# Patient Record
Sex: FEMALE | Race: WHITE | NOT HISPANIC OR LATINO | Employment: UNEMPLOYED | URBAN - METROPOLITAN AREA
[De-identification: names, ages, dates, MRNs, and addresses within clinical notes are randomized per-mention and may not be internally consistent; named-entity substitution may affect disease eponyms.]

---

## 2017-01-03 ENCOUNTER — HOSPITAL ENCOUNTER (OUTPATIENT)
Dept: LAB | Facility: MEDICAL CENTER | Age: 27
End: 2017-01-03
Attending: NURSE PRACTITIONER
Payer: COMMERCIAL

## 2017-01-03 LAB
ALBUMIN SERPL BCP-MCNC: 3.9 G/DL (ref 3.2–4.9)
ALBUMIN/GLOB SERPL: 1.4 G/DL
ALP SERPL-CCNC: 39 U/L (ref 30–99)
ALT SERPL-CCNC: 9 U/L (ref 2–50)
ANION GAP SERPL CALC-SCNC: 8 MMOL/L (ref 0–11.9)
AST SERPL-CCNC: 14 U/L (ref 12–45)
BASOPHILS # BLD AUTO: 0.04 K/UL (ref 0–0.12)
BASOPHILS NFR BLD AUTO: 0.4 % (ref 0–1.8)
BILIRUB SERPL-MCNC: 2.2 MG/DL (ref 0.1–1.5)
BUN SERPL-MCNC: 8 MG/DL (ref 8–22)
CALCIUM SERPL-MCNC: 9.2 MG/DL (ref 8.5–10.5)
CHLORIDE SERPL-SCNC: 104 MMOL/L (ref 96–112)
CO2 SERPL-SCNC: 24 MMOL/L (ref 20–33)
CREAT SERPL-MCNC: 0.51 MG/DL (ref 0.5–1.4)
EOSINOPHIL # BLD: 0.07 K/UL (ref 0–0.51)
EOSINOPHIL NFR BLD AUTO: 0.7 % (ref 0–6.9)
ERYTHROCYTE [DISTWIDTH] IN BLOOD BY AUTOMATED COUNT: 44.6 FL (ref 35.9–50)
GLOBULIN SER CALC-MCNC: 2.7 G/DL (ref 1.9–3.5)
GLUCOSE SERPL-MCNC: 73 MG/DL (ref 65–99)
HBV SURFACE AG SERPL QL IA: NEGATIVE
HCT VFR BLD AUTO: 39.1 % (ref 37–47)
HGB BLD-MCNC: 12.7 G/DL (ref 12–16)
IMM GRANULOCYTES # BLD AUTO: 0.04 K/UL (ref 0–0.11)
IMM GRANULOCYTES NFR BLD AUTO: 0.4 % (ref 0–0.9)
LYMPHOCYTES # BLD: 1.81 K/UL (ref 1–4.8)
LYMPHOCYTES NFR BLD AUTO: 17.7 % (ref 22–41)
MCH RBC QN AUTO: 32.4 PG (ref 27–33)
MCHC RBC AUTO-ENTMCNC: 32.5 G/DL (ref 33.6–35)
MCV RBC AUTO: 99.7 FL (ref 81.4–97.8)
MONOCYTES # BLD: 0.56 K/UL (ref 0–0.85)
MONOCYTES NFR BLD AUTO: 5.5 % (ref 0–13.4)
NEUTROPHILS # BLD: 7.71 K/UL (ref 2–7.15)
NEUTROPHILS NFR BLD AUTO: 75.3 % (ref 44–72)
NRBC # BLD AUTO: 0 K/UL
NRBC BLD-RTO: 0 /100 WBC
PLATELET # BLD AUTO: 255 K/UL (ref 164–446)
PMV BLD AUTO: 8.8 FL (ref 9–12.9)
POTASSIUM SERPL-SCNC: 3.6 MMOL/L (ref 3.6–5.5)
PROT SERPL-MCNC: 6.6 G/DL (ref 6–8.2)
RBC # BLD AUTO: 3.92 M/UL (ref 4.2–5.4)
RUBV IGG SERPL IA-ACNC: 60.9 IU/ML
SODIUM SERPL-SCNC: 136 MMOL/L (ref 135–145)
TREPONEMA PALLIDUM IGG+IGM AB [PRESENCE] IN SERUM OR PLASMA BY IMMUNOASSAY: NON REACTIVE
WBC # BLD AUTO: 10.2 K/UL (ref 4.8–10.8)

## 2017-01-03 PROCEDURE — 86780 TREPONEMA PALLIDUM: CPT

## 2017-01-03 PROCEDURE — 87389 HIV-1 AG W/HIV-1&-2 AB AG IA: CPT

## 2017-01-03 PROCEDURE — 36415 COLL VENOUS BLD VENIPUNCTURE: CPT

## 2017-01-03 PROCEDURE — 86900 BLOOD TYPING SEROLOGIC ABO: CPT

## 2017-01-03 PROCEDURE — 86850 RBC ANTIBODY SCREEN: CPT

## 2017-01-03 PROCEDURE — 80053 COMPREHEN METABOLIC PANEL: CPT

## 2017-01-03 PROCEDURE — 86901 BLOOD TYPING SEROLOGIC RH(D): CPT

## 2017-01-03 PROCEDURE — 87077 CULTURE AEROBIC IDENTIFY: CPT

## 2017-01-03 PROCEDURE — 85025 COMPLETE CBC W/AUTO DIFF WBC: CPT

## 2017-01-03 PROCEDURE — 86762 RUBELLA ANTIBODY: CPT

## 2017-01-03 PROCEDURE — 87086 URINE CULTURE/COLONY COUNT: CPT

## 2017-01-03 PROCEDURE — 87340 HEPATITIS B SURFACE AG IA: CPT

## 2017-01-05 LAB
BACTERIA UR CULT: ABNORMAL
BACTERIA UR CULT: ABNORMAL
SIGNIFICANT IND 70042: ABNORMAL
SOURCE SOURCE: ABNORMAL

## 2017-01-11 ENCOUNTER — HOSPITAL ENCOUNTER (OUTPATIENT)
Dept: LAB | Facility: MEDICAL CENTER | Age: 27
End: 2017-01-11
Attending: OBSTETRICS & GYNECOLOGY
Payer: COMMERCIAL

## 2017-01-11 PROCEDURE — 36415 COLL VENOUS BLD VENIPUNCTURE: CPT

## 2017-01-11 PROCEDURE — 84163 PAPPA SERUM: CPT

## 2017-01-11 PROCEDURE — 84702 CHORIONIC GONADOTROPIN TEST: CPT

## 2017-01-16 LAB
# FETUSES US: 1
AGE AT DELIVERY: 24.9 YEARS
COMMENT  Z4564: NORMAL
FET CRL US.MEAS: 60.3 MM
FET NUCHAL FOLD MOM THICKNESS US.MEAS: 0.74
FET NUCHAL FOLD THICKNESS US.MEAS: 1.2 MM
FET TS 18 RISK FROM MAT AGE: NORMAL
FET TS 21 RISK FROM MAT AGE: NORMAL
GA: 13.3 WEEKS
HCG ADJ MOM SERPL: 1.26
HCG SERPL-ACNC: 112.9 IU/ML
INTEGRATED SCN PATIENT-IMP: NORMAL
NOTE Z4565: NORMAL
PAPP-A MOM SERPL: 1.23
PAPP-A SERPL-MCNC: 1983.8 NG/ML
RESULTS Z4535: NORMAL
SONOGRAPHER: NORMAL
SUBMIT PART2 SAMPLE USING Z4537: NORMAL
TS 18 RISK FETUS: NORMAL
TS 21 RISK FETUS: NORMAL
US DATE: NORMAL

## 2017-02-06 ENCOUNTER — HOSPITAL ENCOUNTER (OUTPATIENT)
Dept: LAB | Facility: MEDICAL CENTER | Age: 27
End: 2017-02-06
Attending: OBSTETRICS & GYNECOLOGY
Payer: COMMERCIAL

## 2017-02-06 PROCEDURE — 82105 ALPHA-FETOPROTEIN SERUM: CPT

## 2017-02-06 PROCEDURE — 36415 COLL VENOUS BLD VENIPUNCTURE: CPT

## 2017-02-06 PROCEDURE — 86336 INHIBIN A: CPT

## 2017-02-06 PROCEDURE — 84702 CHORIONIC GONADOTROPIN TEST: CPT

## 2017-02-06 PROCEDURE — 82677 ASSAY OF ESTRIOL: CPT

## 2017-02-09 LAB
# FETUSES US: 1
AFP ADJ MOM SERPL: 1.09
AFP SERPL-MCNC: 41.9 NG/ML
AGE AT DELIVERY: 24.9 YEARS
COLLECT DATE: NORMAL
COLLECT DATE: NORMAL
COMMENT  Z4564: NORMAL
FET CRL US.MEAS: 60.3 MM
FET NUCHAL FOLD MOM THICKNESS US.MEAS: 0.74
FET NUCHAL FOLD THICKNESS US.MEAS: 1.2 MM
FET TS 18 RISK FROM MAT AGE: NORMAL
FET TS 21 RISK FROM MAT AGE: NORMAL
GA: 13.3 WEEKS
GA: 17 WEEKS
HCG ADJ MOM SERPL: 1.08
HCG SERPL-ACNC: 34.1 IU/ML
IDDM PATIENT QL: NO
INHIBIN A ADJ MOM SERPL: 1.12
INHIBIN A SERPL-MCNC: 214.6 PG/ML
INTEGRATED SCN PATIENT-IMP: NORMAL
NEURAL TUBE DEFECT RISK FETUS: NORMAL %
NOTE Z4565: NORMAL
PAPP-A MOM SERPL: 1.23
PAPP-A SERPL-MCNC: 1983.8 NG/ML
RESULTS Z4535: NORMAL
SONOGRAPHER: NORMAL
TS 18 RISK FETUS: NORMAL
TS 21 RISK FETUS: NORMAL
U ESTRIOL ADJ MOM SERPL: 0.89
U ESTRIOL SERPL-MCNC: 0.97 NG/ML
US DATE: NORMAL

## 2017-02-14 ENCOUNTER — HOSPITAL ENCOUNTER (OUTPATIENT)
Dept: LAB | Facility: MEDICAL CENTER | Age: 27
End: 2017-02-14
Attending: OBSTETRICS & GYNECOLOGY
Payer: COMMERCIAL

## 2017-02-14 PROCEDURE — 87086 URINE CULTURE/COLONY COUNT: CPT

## 2017-02-16 LAB
BACTERIA UR CULT: NORMAL
SIGNIFICANT IND 70042: NORMAL
SITE SITE: NORMAL
SOURCE SOURCE: NORMAL

## 2017-04-24 ENCOUNTER — HOSPITAL ENCOUNTER (OUTPATIENT)
Dept: LAB | Facility: MEDICAL CENTER | Age: 27
End: 2017-04-24
Attending: OBSTETRICS & GYNECOLOGY
Payer: COMMERCIAL

## 2017-04-24 LAB
ERYTHROCYTE [DISTWIDTH] IN BLOOD BY AUTOMATED COUNT: 45.7 FL (ref 35.9–50)
GLUCOSE 1H P 50 G GLC PO SERPL-MCNC: 109 MG/DL (ref 70–139)
HCT VFR BLD AUTO: 35.9 % (ref 37–47)
HGB BLD-MCNC: 11.5 G/DL (ref 12–16)
MCH RBC QN AUTO: 33.2 PG (ref 27–33)
MCHC RBC AUTO-ENTMCNC: 32 G/DL (ref 33.6–35)
MCV RBC AUTO: 103.8 FL (ref 81.4–97.8)
PLATELET # BLD AUTO: 256 K/UL (ref 164–446)
PMV BLD AUTO: 9.2 FL (ref 9–12.9)
RBC # BLD AUTO: 3.46 M/UL (ref 4.2–5.4)
WBC # BLD AUTO: 9.6 K/UL (ref 4.8–10.8)

## 2017-04-24 PROCEDURE — 82950 GLUCOSE TEST: CPT

## 2017-04-24 PROCEDURE — 36415 COLL VENOUS BLD VENIPUNCTURE: CPT

## 2017-04-24 PROCEDURE — 85027 COMPLETE CBC AUTOMATED: CPT

## 2017-07-13 ENCOUNTER — HOSPITAL ENCOUNTER (INPATIENT)
Facility: MEDICAL CENTER | Age: 27
LOS: 2 days | End: 2017-07-15
Attending: OBSTETRICS & GYNECOLOGY | Admitting: OBSTETRICS & GYNECOLOGY
Payer: COMMERCIAL

## 2017-07-13 LAB
BASOPHILS # BLD AUTO: 0.3 % (ref 0–1.8)
BASOPHILS # BLD: 0.03 K/UL (ref 0–0.12)
EOSINOPHIL # BLD AUTO: 0.1 K/UL (ref 0–0.51)
EOSINOPHIL NFR BLD: 1 % (ref 0–6.9)
ERYTHROCYTE [DISTWIDTH] IN BLOOD BY AUTOMATED COUNT: 44.1 FL (ref 35.9–50)
HCT VFR BLD AUTO: 35.7 % (ref 37–47)
HGB BLD-MCNC: 11.8 G/DL (ref 12–16)
HOLDING TUBE BB 8507: NORMAL
IMM GRANULOCYTES # BLD AUTO: 0.08 K/UL (ref 0–0.11)
IMM GRANULOCYTES NFR BLD AUTO: 0.8 % (ref 0–0.9)
LYMPHOCYTES # BLD AUTO: 2.23 K/UL (ref 1–4.8)
LYMPHOCYTES NFR BLD: 22.7 % (ref 22–41)
MCH RBC QN AUTO: 33.1 PG (ref 27–33)
MCHC RBC AUTO-ENTMCNC: 33.1 G/DL (ref 33.6–35)
MCV RBC AUTO: 100 FL (ref 81.4–97.8)
MONOCYTES # BLD AUTO: 0.71 K/UL (ref 0–0.85)
MONOCYTES NFR BLD AUTO: 7.2 % (ref 0–13.4)
NEUTROPHILS # BLD AUTO: 6.69 K/UL (ref 2–7.15)
NEUTROPHILS NFR BLD: 68 % (ref 44–72)
NRBC # BLD AUTO: 0 K/UL
NRBC BLD AUTO-RTO: 0 /100 WBC
PLATELET # BLD AUTO: 226 K/UL (ref 164–446)
PMV BLD AUTO: 9.3 FL (ref 9–12.9)
RBC # BLD AUTO: 3.57 M/UL (ref 4.2–5.4)
WBC # BLD AUTO: 9.8 K/UL (ref 4.8–10.8)

## 2017-07-13 PROCEDURE — 304965 HCHG RECOVERY SERVICES

## 2017-07-13 PROCEDURE — 36415 COLL VENOUS BLD VENIPUNCTURE: CPT

## 2017-07-13 PROCEDURE — 10907ZC DRAINAGE OF AMNIOTIC FLUID, THERAPEUTIC FROM PRODUCTS OF CONCEPTION, VIA NATURAL OR ARTIFICIAL OPENING: ICD-10-PCS | Performed by: OBSTETRICS & GYNECOLOGY

## 2017-07-13 PROCEDURE — 3E033VJ INTRODUCTION OF OTHER HORMONE INTO PERIPHERAL VEIN, PERCUTANEOUS APPROACH: ICD-10-PCS | Performed by: OBSTETRICS & GYNECOLOGY

## 2017-07-13 PROCEDURE — 303615 HCHG EPIDURAL/SPINAL ANESTHESIA FOR LABOR

## 2017-07-13 PROCEDURE — 4A1HX4Z MONITORING OF PRODUCTS OF CONCEPTION, CARDIAC ELECTRICAL ACTIVITY, EXTERNAL APPROACH: ICD-10-PCS | Performed by: OBSTETRICS & GYNECOLOGY

## 2017-07-13 PROCEDURE — 700111 HCHG RX REV CODE 636 W/ 250 OVERRIDE (IP): Performed by: OBSTETRICS & GYNECOLOGY

## 2017-07-13 PROCEDURE — 59409 OBSTETRICAL CARE: CPT

## 2017-07-13 PROCEDURE — 700105 HCHG RX REV CODE 258: Performed by: OBSTETRICS & GYNECOLOGY

## 2017-07-13 PROCEDURE — 700111 HCHG RX REV CODE 636 W/ 250 OVERRIDE (IP)

## 2017-07-13 PROCEDURE — 85025 COMPLETE CBC W/AUTO DIFF WBC: CPT

## 2017-07-13 PROCEDURE — 770002 HCHG ROOM/CARE - OB PRIVATE (112)

## 2017-07-13 PROCEDURE — 700102 HCHG RX REV CODE 250 W/ 637 OVERRIDE(OP): Performed by: OBSTETRICS & GYNECOLOGY

## 2017-07-13 PROCEDURE — 700105 HCHG RX REV CODE 258

## 2017-07-13 PROCEDURE — A9270 NON-COVERED ITEM OR SERVICE: HCPCS | Performed by: OBSTETRICS & GYNECOLOGY

## 2017-07-13 PROCEDURE — 10H07YZ INSERTION OF OTHER DEVICE INTO PRODUCTS OF CONCEPTION, VIA NATURAL OR ARTIFICIAL OPENING: ICD-10-PCS | Performed by: OBSTETRICS & GYNECOLOGY

## 2017-07-13 RX ORDER — ACETAMINOPHEN 325 MG/1
325 TABLET ORAL EVERY 4 HOURS PRN
Status: DISCONTINUED | OUTPATIENT
Start: 2017-07-13 | End: 2017-07-15 | Stop reason: HOSPADM

## 2017-07-13 RX ORDER — METHYLERGONOVINE MALEATE 0.2 MG/ML
INJECTION INTRAVENOUS
Status: COMPLETED
Start: 2017-07-13 | End: 2017-07-13

## 2017-07-13 RX ORDER — VITAMIN A ACETATE, BETA CAROTENE, ASCORBIC ACID, CHOLECALCIFEROL, .ALPHA.-TOCOPHEROL ACETATE, DL-, THIAMINE MONONITRATE, RIBOFLAVIN, NIACINAMIDE, PYRIDOXINE HYDROCHLORIDE, FOLIC ACID, CYANOCOBALAMIN, CALCIUM CARBONATE, FERROUS FUMARATE, ZINC OXIDE, CUPRIC OXIDE 3080; 12; 120; 400; 1; 1.84; 3; 20; 22; 920; 25; 200; 27; 10; 2 [IU]/1; UG/1; MG/1; [IU]/1; MG/1; MG/1; MG/1; MG/1; MG/1; [IU]/1; MG/1; MG/1; MG/1; MG/1; MG/1
1 TABLET, FILM COATED ORAL EVERY MORNING
Status: DISCONTINUED | OUTPATIENT
Start: 2017-07-14 | End: 2017-07-15 | Stop reason: HOSPADM

## 2017-07-13 RX ORDER — OXYTOCIN 10 [USP'U]/ML
10 INJECTION, SOLUTION INTRAMUSCULAR; INTRAVENOUS
Status: DISCONTINUED | OUTPATIENT
Start: 2017-07-13 | End: 2017-07-13 | Stop reason: HOSPADM

## 2017-07-13 RX ORDER — SODIUM CHLORIDE, SODIUM LACTATE, POTASSIUM CHLORIDE, CALCIUM CHLORIDE 600; 310; 30; 20 MG/100ML; MG/100ML; MG/100ML; MG/100ML
INJECTION, SOLUTION INTRAVENOUS
Status: COMPLETED
Start: 2017-07-13 | End: 2017-07-13

## 2017-07-13 RX ORDER — METHYLERGONOVINE MALEATE 0.2 MG/ML
0.2 INJECTION INTRAVENOUS
Status: DISCONTINUED | OUTPATIENT
Start: 2017-07-13 | End: 2017-07-13 | Stop reason: HOSPADM

## 2017-07-13 RX ORDER — SODIUM CHLORIDE, SODIUM LACTATE, POTASSIUM CHLORIDE, CALCIUM CHLORIDE 600; 310; 30; 20 MG/100ML; MG/100ML; MG/100ML; MG/100ML
INJECTION, SOLUTION INTRAVENOUS CONTINUOUS
Status: DISPENSED | OUTPATIENT
Start: 2017-07-13 | End: 2017-07-13

## 2017-07-13 RX ORDER — MISOPROSTOL 200 UG/1
800 TABLET ORAL
Status: COMPLETED | OUTPATIENT
Start: 2017-07-13 | End: 2017-07-13

## 2017-07-13 RX ORDER — PENICILLIN G POTASSIUM 5000000 [IU]/1
5 INJECTION, POWDER, FOR SOLUTION INTRAMUSCULAR; INTRAVENOUS ONCE
Status: COMPLETED | OUTPATIENT
Start: 2017-07-13 | End: 2017-07-13

## 2017-07-13 RX ORDER — ONDANSETRON 2 MG/ML
4 INJECTION INTRAMUSCULAR; INTRAVENOUS EVERY 6 HOURS PRN
Status: DISCONTINUED | OUTPATIENT
Start: 2017-07-13 | End: 2017-07-15 | Stop reason: HOSPADM

## 2017-07-13 RX ORDER — ONDANSETRON 4 MG/1
4 TABLET, ORALLY DISINTEGRATING ORAL EVERY 6 HOURS PRN
Status: DISCONTINUED | OUTPATIENT
Start: 2017-07-13 | End: 2017-07-15 | Stop reason: HOSPADM

## 2017-07-13 RX ORDER — MISOPROSTOL 200 UG/1
600 TABLET ORAL
Status: DISCONTINUED | OUTPATIENT
Start: 2017-07-13 | End: 2017-07-15 | Stop reason: HOSPADM

## 2017-07-13 RX ORDER — IBUPROFEN 600 MG/1
600 TABLET ORAL EVERY 6 HOURS PRN
Status: DISCONTINUED | OUTPATIENT
Start: 2017-07-13 | End: 2017-07-15 | Stop reason: HOSPADM

## 2017-07-13 RX ORDER — OXYCODONE HYDROCHLORIDE AND ACETAMINOPHEN 5; 325 MG/1; MG/1
1 TABLET ORAL EVERY 4 HOURS PRN
Status: DISCONTINUED | OUTPATIENT
Start: 2017-07-13 | End: 2017-07-15 | Stop reason: HOSPADM

## 2017-07-13 RX ORDER — MAG HYDROX/ALUMINUM HYD/SIMETH 400-400-40
1 SUSPENSION, ORAL (FINAL DOSE FORM) ORAL
Status: DISCONTINUED | OUTPATIENT
Start: 2017-07-13 | End: 2017-07-15 | Stop reason: HOSPADM

## 2017-07-13 RX ORDER — OXYCODONE AND ACETAMINOPHEN 10; 325 MG/1; MG/1
1 TABLET ORAL EVERY 4 HOURS PRN
Status: DISCONTINUED | OUTPATIENT
Start: 2017-07-13 | End: 2017-07-15 | Stop reason: HOSPADM

## 2017-07-13 RX ORDER — ROPIVACAINE HYDROCHLORIDE 2 MG/ML
INJECTION, SOLUTION EPIDURAL; INFILTRATION; PERINEURAL
Status: COMPLETED
Start: 2017-07-13 | End: 2017-07-13

## 2017-07-13 RX ORDER — ONDANSETRON 2 MG/ML
INJECTION INTRAMUSCULAR; INTRAVENOUS
Status: COMPLETED
Start: 2017-07-13 | End: 2017-07-13

## 2017-07-13 RX ORDER — SODIUM CHLORIDE, SODIUM LACTATE, POTASSIUM CHLORIDE, CALCIUM CHLORIDE 600; 310; 30; 20 MG/100ML; MG/100ML; MG/100ML; MG/100ML
INJECTION, SOLUTION INTRAVENOUS PRN
Status: DISCONTINUED | OUTPATIENT
Start: 2017-07-13 | End: 2017-07-15 | Stop reason: HOSPADM

## 2017-07-13 RX ORDER — CARBOPROST TROMETHAMINE 250 UG/ML
250 INJECTION, SOLUTION INTRAMUSCULAR
Status: DISCONTINUED | OUTPATIENT
Start: 2017-07-13 | End: 2017-07-15 | Stop reason: HOSPADM

## 2017-07-13 RX ORDER — DEXTROSE, SODIUM CHLORIDE, SODIUM LACTATE, POTASSIUM CHLORIDE, AND CALCIUM CHLORIDE 5; .6; .31; .03; .02 G/100ML; G/100ML; G/100ML; G/100ML; G/100ML
INJECTION, SOLUTION INTRAVENOUS CONTINUOUS
Status: DISCONTINUED | OUTPATIENT
Start: 2017-07-13 | End: 2017-07-13 | Stop reason: HOSPADM

## 2017-07-13 RX ORDER — DOCUSATE SODIUM 100 MG/1
100 CAPSULE, LIQUID FILLED ORAL 2 TIMES DAILY PRN
Status: DISCONTINUED | OUTPATIENT
Start: 2017-07-13 | End: 2017-07-15 | Stop reason: HOSPADM

## 2017-07-13 RX ORDER — METHYLERGONOVINE MALEATE 0.2 MG/ML
0.2 INJECTION INTRAVENOUS
Status: DISCONTINUED | OUTPATIENT
Start: 2017-07-13 | End: 2017-07-15 | Stop reason: HOSPADM

## 2017-07-13 RX ORDER — CARBOPROST TROMETHAMINE 250 UG/ML
250 INJECTION, SOLUTION INTRAMUSCULAR
Status: DISCONTINUED | OUTPATIENT
Start: 2017-07-13 | End: 2017-07-13 | Stop reason: HOSPADM

## 2017-07-13 RX ORDER — BISACODYL 10 MG
10 SUPPOSITORY, RECTAL RECTAL PRN
Status: DISCONTINUED | OUTPATIENT
Start: 2017-07-13 | End: 2017-07-15 | Stop reason: HOSPADM

## 2017-07-13 RX ORDER — ALUMINA, MAGNESIA, AND SIMETHICONE 2400; 2400; 240 MG/30ML; MG/30ML; MG/30ML
30 SUSPENSION ORAL EVERY 6 HOURS PRN
Status: DISCONTINUED | OUTPATIENT
Start: 2017-07-13 | End: 2017-07-13 | Stop reason: HOSPADM

## 2017-07-13 RX ORDER — RANITIDINE 150 MG/1
150 TABLET ORAL 2 TIMES DAILY
Status: ON HOLD | COMMUNITY
End: 2019-10-25

## 2017-07-13 RX ORDER — ONDANSETRON 4 MG/1
4 TABLET, ORALLY DISINTEGRATING ORAL DAILY
Status: ON HOLD | COMMUNITY
End: 2017-07-15

## 2017-07-13 RX ADMIN — SODIUM CHLORIDE, POTASSIUM CHLORIDE, SODIUM LACTATE AND CALCIUM CHLORIDE: 600; 310; 30; 20 INJECTION, SOLUTION INTRAVENOUS at 07:08

## 2017-07-13 RX ADMIN — OXYTOCIN 125 ML/HR: 10 INJECTION, SOLUTION INTRAMUSCULAR; INTRAVENOUS at 21:00

## 2017-07-13 RX ADMIN — ROPIVACAINE HYDROCHLORIDE 10 ML/HR: 2 INJECTION, SOLUTION EPIDURAL; INFILTRATION at 17:32

## 2017-07-13 RX ADMIN — SODIUM CHLORIDE, SODIUM LACTATE, POTASSIUM CHLORIDE, CALCIUM CHLORIDE AND DEXTROSE MONOHYDRATE: 5; 600; 310; 30; 20 INJECTION, SOLUTION INTRAVENOUS at 15:18

## 2017-07-13 RX ADMIN — ONDANSETRON 4 MG: 2 INJECTION INTRAMUSCULAR; INTRAVENOUS at 16:58

## 2017-07-13 RX ADMIN — OXYTOCIN 1 MILLI-UNITS/MIN: 10 INJECTION, SOLUTION INTRAMUSCULAR; INTRAVENOUS at 07:22

## 2017-07-13 RX ADMIN — SODIUM CHLORIDE 2.5 MILLION UNITS: 9 INJECTION, SOLUTION INTRAVENOUS at 16:00

## 2017-07-13 RX ADMIN — IBUPROFEN 600 MG: 600 TABLET, FILM COATED ORAL at 20:55

## 2017-07-13 RX ADMIN — MISOPROSTOL 800 MCG: 200 TABLET ORAL at 19:44

## 2017-07-13 RX ADMIN — PENICILLIN G POTASSIUM 5 MILLION UNITS: 5000000 POWDER, FOR SOLUTION INTRAMUSCULAR; INTRAPLEURAL; INTRATHECAL; INTRAVENOUS at 07:21

## 2017-07-13 RX ADMIN — SODIUM CHLORIDE, POTASSIUM CHLORIDE, SODIUM LACTATE AND CALCIUM CHLORIDE 1000 ML: 600; 310; 30; 20 INJECTION, SOLUTION INTRAVENOUS at 16:59

## 2017-07-13 RX ADMIN — ALUMINUM HYDROXIDE, MAGNESIUM HYDROXIDE,SIMETHICONE 30 ML: 400; 400; 40 LIQUID ORAL at 07:16

## 2017-07-13 RX ADMIN — SODIUM CHLORIDE 2.5 MILLION UNITS: 9 INJECTION, SOLUTION INTRAVENOUS at 11:59

## 2017-07-13 ASSESSMENT — COPD QUESTIONNAIRES
DO YOU EVER COUGH UP ANY MUCUS OR PHLEGM?: NO/ONLY WITH OCCASIONAL COLDS OR INFECTIONS
COPD SCREENING SCORE: 0
DURING THE PAST 4 WEEKS HOW MUCH DID YOU FEEL SHORT OF BREATH: NONE/LITTLE OF THE TIME
HAVE YOU SMOKED AT LEAST 100 CIGARETTES IN YOUR ENTIRE LIFE: NO/DON'T KNOW

## 2017-07-13 ASSESSMENT — PATIENT HEALTH QUESTIONNAIRE - PHQ9
SUM OF ALL RESPONSES TO PHQ QUESTIONS 1-9: 0
2. FEELING DOWN, DEPRESSED, IRRITABLE, OR HOPELESS: NOT AT ALL
1. LITTLE INTEREST OR PLEASURE IN DOING THINGS: NOT AT ALL
SUM OF ALL RESPONSES TO PHQ9 QUESTIONS 1 AND 2: 0

## 2017-07-13 ASSESSMENT — PAIN SCALES - GENERAL: PAINLEVEL_OUTOF10: 2

## 2017-07-13 ASSESSMENT — LIFESTYLE VARIABLES
DO YOU DRINK ALCOHOL: NO
DO YOU DRINK ALCOHOL: NO
EVER_SMOKED: NEVER
ALCOHOL_USE: NO

## 2017-07-13 NOTE — CARE PLAN
Problem: Pain  Goal: Alleviation of Pain or a reduction in pain to the patient’s comfort goal  Outcome: PROGRESSING AS EXPECTED  Pt knows her pain management options. She will let the RN know when she is requiring pain intervention.

## 2017-07-13 NOTE — H&P
"H&P    28yo  @ 39w2d here for elective IOL. Feeling occ cramping. Denies LOF/VB    PN Care:  Working  XIN 17  RH+, RI, GBS positive   Sequential neg, US normal  Intermittent fetal arrythmia with normal fetal echo    PMH: Depression  PSH: [place] tooth removal  Meds: PN vitamins  All: NKDA  FH: Breast cancer, cholesterol, HTN, Pancreatic cancer  Gyn: No hx of HSV  Ob:  G1-term  7lb7oz   G2-current  Soc:  Ryan. No BILLIE    Blood pressure 134/82, pulse 101, temperature 36.6 °C (97.8 °F), resp. rate 18, height 1.549 m (5' 1\"), weight 68.947 kg (152 lb).  Gen: NAD  SVE: 2/50/-3  [place]: CTX q6-10  FHT: 140 with moderate LTV. +accels. Occ variable    Labs; Hct 35, Plt 226    A/P 25yo  @ 39w2d for elective IOL  1. Labor: IOL with pitocin. R/B/A reviewed including increased risk of csxn. Declines to wait expectantly for labor  2. Fetal: Cat II  3. Pain: epidural prn  4. GBS positive on PCN   "

## 2017-07-13 NOTE — IP AVS SNAPSHOT
CookItFor.Us Access Code: Activation code not generated  Current CookItFor.Us Status: Active    [employer]  A secure, online tool to manage your health information     [employer]’s CookItFor.Us® is a secure, online tool that connects you to your personalized health information from the privacy of your home -- day or night - making it very easy for you to manage your healthcare. Once the activation process is completed, you can even access your medical information using the CookItFor.Us greg, which is available for free in the Apple Greg store or Google Play store.     CookItFor.Us provides the following levels of access (as shown below):   My Chart Features   [facility] Primary Care Doctor [facility]  Specialists [facility]  Urgent  Care Non-[facility]  Primary Care  Doctor   Email your healthcare team securely and privately 24/7 X X X X   Manage appointments: schedule your next appointment; view details of past/upcoming appointments X      Request prescription refills. X      View recent personal medical records, including lab and immunizations X X X X   View health record, including health history, allergies, medications X X X X   Read reports about your outpatient visits, procedures, consult and ER notes X X X X   See your discharge summary, which is a recap of your hospital and/or ER visit that includes your diagnosis, lab results, and care plan. X X       How to register for CookItFor.Us:  1. Go to  [email or web address].  2. Click on the Sign Up Now box, which takes you to the New Member Sign Up page. You will need to provide the following information:  a. Enter your CookItFor.Us Access Code exactly as it appears at the top of this page. (You will not need to use this code after you’ve completed the sign-up process. If you do not sign up before the expiration date, you must request a new code.)   b. Enter your date of birth.   c. Enter your home email address.   d. Click Submit, and follow the next screen’s instructions.  3. Create a CookItFor.Us ID. This will  be your Doculogy login ID and cannot be changed, so think of one that is secure and easy to remember.  4. Create a Doculogy password. You can change your password at any time.  5. Enter your Password Reset Question and Answer. This can be used at a later time if you forget your password.   6. Enter your e-mail address. This allows you to receive e-mail notifications when new information is available in Doculogy.  7. Click Sign Up. You can now view your health information.    For assistance activating your Doculogy account, call [phone]

## 2017-07-13 NOTE — PROGRESS NOTES
1345: Report received from REGULO Sigala RN. POC discussed. Pt doing well. Starting to feel UCs but they are not painful. Pitocin infusing at 15 mu/min. EFT/toco in place. VSS. FOB at bedside.  1605: Pt requesting to be checked to see if she is making progress. Pt reports mild pain at this time and Pitocin is now at 20mu/min. SVE by RN, 4/75/-2.  1720: Dr. Silverman at bedside to place epidural.  1850: Report given to GABRIEL Dow RN. POC discussed.

## 2017-07-13 NOTE — PROGRESS NOTES
"S: Feeling CTX but not painful    O: Blood pressure 127/77, pulse 88, temperature 36.6 °C (97.8 °F), resp. rate 18, height 1.549 m (5' 1\"), weight 68.947 kg (152 lb).  Gen: NAD  SVE: 3/60/-2  AROM clear  [place]: CTX q3-4  FHT: Baseline 120 with moderate LTV. +accels. No variables/decels    A/P 27yo  @ 39w2d  1. Labor: Continue pitocin  2. Cat I  3. Epidural prn  4. GBS positive on PCN    "

## 2017-07-13 NOTE — IP AVS SNAPSHOT
7/15/2017    Amy Ireland  [address] [place] [id number]    Dear Amy:    [facility] wants to ensure your discharge home is safe and you or your loved ones have had all of your questions answered regarding your care after you leave the hospital.    Below is a list of resources and contact information should you have any questions regarding your hospital stay, follow-up instructions, or active medical symptoms.    Questions or Concerns Regarding… Contact   Medical Questions Related to Your Discharge  (7 days a week, 8am-5pm) Contact a Nurse Care Coordinator   [phone]   Medical Questions Not Related to Your Discharge  (24 hours a day / 7 days a week)  Contact the Nurse Health Line   [phone]    Medications or Discharge Instructions Refer to your discharge packet   or contact your [facility] Primary Care Provider   [phone]   Follow-up Appointment(s) Schedule your appointment via [employer]   or contact Scheduling [phone]   Billing Review your statement via [employer]  or contact Billing [phone]   Medical Records Review your records via [employer]   or contact Medical Records [phone]     You may receive a telephone call within two days of discharge. This call is to make certain you understand your discharge instructions and have the opportunity to have any questions answered. You can also easily access your medical information, test results and upcoming appointments via the [employer] free online health management tool. You can learn more and sign up at [employer]. For assistance setting up your [employer] account, please call [phone].    Once again, we want to ensure your discharge home is safe and that you have a clear understanding of any next steps in your care. If you have any questions or concerns, please do not hesitate to contact us, we are here for you. Thank you for choosing [facility] for your healthcare needs.    Sincerely,    Your [facility] Healthcare Team

## 2017-07-13 NOTE — IP AVS SNAPSHOT
Home Care Instructions                                                                                                                Amy Ireland   MRN: [id number]    Department:  POST PARTUM 31   2017           Follow-up Information     1. Follow up with Gena Mas M.D.. Schedule an appointment as soon as possible for a visit in 6 weeks.    Specialty:  OB/Gyn    Contact information    [id number]  [place] [address]  [phone]         I assume responsibility for securing a follow-up [place] Screening blood test on my baby within the specified date range.    -                  Discharge Instructions       POSTPARTUM DISCHARGE INSTRUCTIONS FOR MOM    YOB: 1990   Age: 26 y.o.               Admit Date: 2017     Discharge Date: 7/15/2017  Attending Doctor:  Gena Mas M.D.                  Allergies:  Review of patient's allergies indicates no known allergies.    Discharged to home by car. Discharged via wheelchair, hospital escort: Yes.  Special equipment needed: Not Applicable  Belongings with: Personal  Be sure to schedule a follow-up appointment with your primary care doctor or any specialists as instructed.     Discharge Plan:   Diet Plan: Discussed  Activity Level: Discussed  Confirmed Follow up Appointment: Appointment Scheduled  Confirmed Symptoms Management: Discussed  Medication Reconciliation Updated: Yes  Influenza Vaccine Indication: Indicated: Not available from distributor/    REASONS TO CALL YOUR OBSTETRICIAN:  1.   Persistent fever or shaking chills (Temperature higher than 100.4)  2.   Heavy bleeding (soaking more than 1 pad per hour); Passing clots  3.   Foul odor from vagina  4.   Mastitis (Breast infection; breast pain, chills, fever, redness)  5.   Urinary pain, burning or frequency  6.   Episiotomy infection  7.   Abdominal incision infection  8.   Severe depression longer than 24 hours    HAND WASHING  · Prior to handling the  "baby.  · Before breastfeeding or bottle feeding baby.  · After using the bathroom or changing the baby's diaper.    VAGINAL CARE  · Nothing inside vagina for 6 weeks: no sexual intercourse, tampons or douching.  · Bleeding may continue for 2-4 weeks.  Amount may vary.    · Call your physician for heavy bleeding which means soaking more than 1 pad per hour    BIRTH CONTROL  · It is possible to become pregnant at any time after delivery and while breastfeeding.  · Plan to discuss a method of birth control with your physician at your follow up visit. visit.    DIET AND ELIMINATION  · Eating more fiber (bran cereal, fruits, and vegetables) and drinking plenty of fluids will help to avoid constipation.  · Urinary frequency after childbirth is normal.    POSTPARTUM BLUES  During the first few days after birth, you may experience a sense of the \"blues\" which may include impatience, irritability or even crying.  These feeling come and go quickly.  However, as many as 1 in 10 women experience emotional symptoms known as postpartum depression.    Postpartum depression:  May start as early as the second or third day after delivery or take several weeks or months to develop.  Symptoms of \"blues\" are present, but are more intense:  Crying spells; loss of appetite; feelings of hopelessness or loss of control; fear of touching the baby; over concern or no concern at all about the baby; little or no concern about your own appearance/caring for yourself; and/or inability to sleep or excessive sleeping.  Contact your physician if you are experiencing any of these symptoms.    Crisis Hotline:  · [place] Crisis Hotline:  4-686-IQQWITJ  Or 1-105.529.8399  · [place] Crisis Hotline:  1-278.366.5429  Or 263-791-3444    PREVENTING SHAKEN BABY:  If you are angry or stressed, PUT THE BABY IN THE CRIB, step away, take some deep breaths, and wait until you are calm to care for the baby.  DO NOT SHAKE THE BABY.  You are not alone, call a " "supporter for help.    · Crisis Call Center 24/7 crisis line 496-445-8673 or 1-467.646.7635  · You can also text them, text \"ANSWER\" to 140567    QUIT SMOKING/TOBACCO USE:  I understand the use of any tobacco products increases my chance of suffering from future heart disease and could cause other illnesses which may shorten my life. Quitting the use of tobacco products is the single most important thing I can do to improve my health. For further information on smoking / tobacco cessation call a Toll Free Quit Line at 1-603.825.2446 (*National Cancer [place]) or 1-363.489.9175 (American Lung Association) or you can access the web based program at www.lungusa.org.    · [place] Tobacco Users Help Line:  (267) 107-4626       Toll Free: 1-321.933.5860  · Quit Tobacco Program [facility] (119)236-1568    DEPRESSION / SUICIDE RISK:  As you are discharged from this [facility], it is important to learn how to keep safe from harming yourself.    Recognize the warning signs:  · Abrupt changes in personality, positive or negative- including increase in energy   · Giving away possessions  · Change in eating patterns- significant weight changes-  positive or negative  · Change in sleeping patterns- unable to sleep or sleeping all the time   · Unwillingness or inability to communicate  · Depression  · Unusual sadness, discouragement and loneliness  · Talk of wanting to die  · Neglect of personal appearance   · Rebelliousness- reckless behavior  · Withdrawal from people/activities they love  · Confusion- inability to concentrate     If you or a loved one observes any of these behaviors or has concerns about self-harm, here's what you can do:  · Talk about it- your feelings and reasons for harming yourself  · Remove any means that you might use to hurt yourself (examples: pills, rope, extension cords, firearm)  · Get professional help from the community (Mental Health, Substance Abuse, " psychological counseling)  · Do not be alone:Call your Safe Contact- someone whom you trust who will be there for you.  · Call your local CRISIS HOTLINE 738-8508 or 555-237-2207  · Call your local Children's Mobile Crisis Response Team [place] (979) 435-7413 or www.GetSocial  · Call the toll free National Suicide Prevention Hotlines   · National Suicide Prevention Lifeline 482-728-SBBL (1384)  · National Hope Line Network 800-SUICIDE (121-9054)    DISCHARGE SURVEY:  Thank you for [facility].  We hope we provided you with very good care.  You may be receiving a survey in the mail.  Please fill it out.  Your opinion is valuable to us.    ADDITIONAL EDUCATIONAL MATERIALS GIVEN TO PATIENT:        My signature on this form indicates that:  1.  I have reviewed and understand the above information  2.  My questions regarding this information have been answered to my satisfaction.  3.  I have formulated a plan with my discharge nurse to obtain my prescribed medication for home.         Discharge Medication Instructions:    Below are the medications your physician expects you to take upon discharge:    Review all your home medications and newly ordered medications with your doctor and/or pharmacist. Follow medication instructions as directed by your doctor and/or pharmacist.    Please keep your medication list with you and share with your physician.               Medication List      START taking these medications        Instructions    Morning Afternoon Evening Bedtime    ibuprofen 600 MG Tabs   Last time this was given:  600 mg on 7/15/2017  9:15 AM   Commonly known as:  MOTRIN        Take 1 Tab by mouth every 6 hours as needed (For cramping after delivery; do not give if patient is receiving ketorolac (Toradol)).   Dose:  600 mg                        oxycodone-acetaminophen 5-325 MG Tabs   Last time this was given:  1 Tab on 7/15/2017  9:26 AM   Commonly known as:  PERCOCET        Take 1 Tab by  mouth every four hours as needed (for Moderate Pain (Pain Scale 4-6) after delivery).   Dose:  1 Tab                          CONTINUE taking these medications        Instructions    Morning Afternoon Evening Bedtime    PRENATAL 1 PO        Take  by mouth.                        ranitidine 150 MG Tabs   Commonly known as:  ZANTAC        Take 150 mg by mouth 2 times a day. Indications: Heartburn   Dose:  150 mg                          STOP taking these medications     ondansetron 4 MG Tbdp   Commonly known as:  ZOFRAN ODT                    Where to Get Your Medications      Information about where to get these medications is not yet available     ! Ask your nurse or doctor about these medications    - ibuprofen 600 MG Tabs  - oxycodone-acetaminophen 5-325 MG Tabs            Crisis Hotline:     [place] Crisis Hotline:  0-641-ZKXMYJR or 1-239.570.2898    [place] Crisis Hotline:    1-452.404.4896 or 420-490-1341        Disclaimer           _____________________________________                     __________       ________       Patient/Mother Signature or Legal                          Date                   Time

## 2017-07-13 NOTE — PROGRESS NOTES
0700. Report from Mimi CAI at the bedside. POC discussed and resumed. IV started, labs obtained and sent.    0720. SVE 1-2/50/-2. Pitocin started.

## 2017-07-13 NOTE — CARE PLAN
Problem: Knowledge Deficit  Goal: Patient/Support person demonstrates understanding regarding the progression of labor, available options and participates in decision-making process  Outcome: PROGRESSING AS EXPECTED  Pt and FOB are up to date on th POC. They will ask questions as needed and the RN will notify them of any changes.

## 2017-07-14 LAB
ERYTHROCYTE [DISTWIDTH] IN BLOOD BY AUTOMATED COUNT: 43.3 FL (ref 35.9–50)
HCT VFR BLD AUTO: 33.7 % (ref 37–47)
HGB BLD-MCNC: 10.9 G/DL (ref 12–16)
MCH RBC QN AUTO: 32.6 PG (ref 27–33)
MCHC RBC AUTO-ENTMCNC: 32.3 G/DL (ref 33.6–35)
MCV RBC AUTO: 100.9 FL (ref 81.4–97.8)
PLATELET # BLD AUTO: 186 K/UL (ref 164–446)
PMV BLD AUTO: 9.1 FL (ref 9–12.9)
RBC # BLD AUTO: 3.34 M/UL (ref 4.2–5.4)
WBC # BLD AUTO: 16.5 K/UL (ref 4.8–10.8)

## 2017-07-14 PROCEDURE — 700112 HCHG RX REV CODE 229: Performed by: OBSTETRICS & GYNECOLOGY

## 2017-07-14 PROCEDURE — A9270 NON-COVERED ITEM OR SERVICE: HCPCS | Performed by: OBSTETRICS & GYNECOLOGY

## 2017-07-14 PROCEDURE — 700102 HCHG RX REV CODE 250 W/ 637 OVERRIDE(OP): Performed by: OBSTETRICS & GYNECOLOGY

## 2017-07-14 PROCEDURE — 85027 COMPLETE CBC AUTOMATED: CPT

## 2017-07-14 PROCEDURE — 770002 HCHG ROOM/CARE - OB PRIVATE (112)

## 2017-07-14 PROCEDURE — 36415 COLL VENOUS BLD VENIPUNCTURE: CPT

## 2017-07-14 RX ADMIN — OXYCODONE HYDROCHLORIDE AND ACETAMINOPHEN 1 TABLET: 5; 325 TABLET ORAL at 14:46

## 2017-07-14 RX ADMIN — IBUPROFEN 600 MG: 600 TABLET, FILM COATED ORAL at 02:55

## 2017-07-14 RX ADMIN — IBUPROFEN 600 MG: 600 TABLET, FILM COATED ORAL at 09:44

## 2017-07-14 RX ADMIN — Medication 1 TABLET: at 09:44

## 2017-07-14 RX ADMIN — IBUPROFEN 600 MG: 600 TABLET, FILM COATED ORAL at 17:50

## 2017-07-14 RX ADMIN — OXYCODONE HYDROCHLORIDE AND ACETAMINOPHEN 1 TABLET: 5; 325 TABLET ORAL at 09:45

## 2017-07-14 RX ADMIN — IBUPROFEN 600 MG: 600 TABLET, FILM COATED ORAL at 23:48

## 2017-07-14 RX ADMIN — OXYCODONE HYDROCHLORIDE AND ACETAMINOPHEN 1 TABLET: 5; 325 TABLET ORAL at 20:10

## 2017-07-14 RX ADMIN — DOCUSATE SODIUM 100 MG: 100 CAPSULE ORAL at 20:14

## 2017-07-14 RX ADMIN — OXYCODONE HYDROCHLORIDE AND ACETAMINOPHEN 1 TABLET: 5; 325 TABLET ORAL at 00:22

## 2017-07-14 RX ADMIN — DOCUSATE SODIUM 100 MG: 100 CAPSULE ORAL at 09:45

## 2017-07-14 RX ADMIN — OXYCODONE HYDROCHLORIDE AND ACETAMINOPHEN 1 TABLET: 5; 325 TABLET ORAL at 04:56

## 2017-07-14 ASSESSMENT — PAIN SCALES - GENERAL
PAINLEVEL_OUTOF10: 6
PAINLEVEL_OUTOF10: 3
PAINLEVEL_OUTOF10: 5
PAINLEVEL_OUTOF10: 3
PAINLEVEL_OUTOF10: 3
PAINLEVEL_OUTOF10: 4
PAINLEVEL_OUTOF10: 6
PAINLEVEL_OUTOF10: 7
PAINLEVEL_OUTOF10: 3
PAINLEVEL_OUTOF10: 3
PAINLEVEL_OUTOF10: 4
PAINLEVEL_OUTOF10: 3

## 2017-07-14 ASSESSMENT — PATIENT HEALTH QUESTIONNAIRE - PHQ9
1. LITTLE INTEREST OR PLEASURE IN DOING THINGS: NOT AT ALL
SUM OF ALL RESPONSES TO PHQ QUESTIONS 1-9: 0
2. FEELING DOWN, DEPRESSED, IRRITABLE, OR HOPELESS: NOT AT ALL
SUM OF ALL RESPONSES TO PHQ9 QUESTIONS 1 AND 2: 0

## 2017-07-14 NOTE — PROGRESS NOTES
"S: requesting epidural     O: Blood pressure 129/68, pulse 94, temperature 36.4 °C (97.6 °F), temperature source Temporal, resp. rate 18, height 1.549 m (5' 1\"), weight 68.947 kg (152 lb).  Gen: NAD  SVE: /-2  Per RN  [place]: CTX q3-4  FHT: Baseline 130 with moderate LTV. +accels. Occ variable    A/P 25yo  @ 39w2d  1. Labor: Continue pitocin  2. Cat 2  3. Awaiting epidural   4. GBS positive on PCN     "

## 2017-07-14 NOTE — PROGRESS NOTES
Pt arrived via wheelchair with belongings to room S326. Report received from Mimi CAI from L&D. Pt oriented to room.  Assessment done. Fundus firm, lochia light. Vital signs stable. IV infusing 125ml/hr of pitocin. Pt states pain is tollerable, see MAR. Pt aware of dangers related to sleeping with infant, reviewed plan of care with pt, and encouraged to call with needs and prior to ambulating. Call light within reach. Will continue to monitor.

## 2017-07-14 NOTE — DELIVERY NOTE
Delivery note     Preoperative diagnosis: 1. Intrauterine pregnancy 39 weeks and 2 days 2. GBS positive  Postoperative diagnosis: Same  Primary delivering obstetrician  working    Procedure: Amy is a 26-year-old  2 para 1 at 39 weeks who presented for induction of labor at the time of presentation she was 2 cm dilated she underwent treatment for GBS positive status and was augmented with Pitocin and she underwent amniotomy revealed clear fluid. She received an epidural and progressed to complete she pushed well to deliver the baby girl OP. Anterior posterior shoulders delivered without difficulty and baby was placed on maternal stomach where she was immediately vigorous cortisol to pulsate for 2 minutes at which point he was clamped and cut placenta delivered easily and intact under gentle manual traction fundus is firm midline Pitocin was started she had no perineal lacerations 800 Cytotec was placed per rectum as prophylaxis    Findings  1. Baby girl at 1839 Apgars 8 and 9 weight currently pending name Scarlet  2. Clear amniotic fluid  3. Normal placenta with three-vessel cord    Counts correct    Consultations none apparent    Disposition stable.

## 2017-07-14 NOTE — PROGRESS NOTES
report received from NADIYA Alvares   ZAHIDA dutta Dr Working called    viable female apgars 1941 placenta S/I   report to JOANNA Alfonso

## 2017-07-14 NOTE — PROGRESS NOTES
2110: Report received from GABRIEL Dow RN. POC discussed with pt and family.   2250: Pt and infant transferred to postpartum in stable condition. Report given to Qian CAI. ID bands verified. Cuddles activated.

## 2017-07-14 NOTE — CARE PLAN
Problem: Altered physiologic condition related to immediate post-delivery state and potential for bleeding/hemorrhage  Goal: Patient physiologically stable as evidenced by normal lochia, palpable uterine involution and vital signs within normal limits  Outcome: PROGRESSING AS EXPECTED  Vital signs stable. Fundus firm, lochia light     Problem: Alteration in comfort related to episiotomy, vaginal repair and/or after birth pains  Goal: Patient verbalizes acceptable pain level  Outcome: PROGRESSING AS EXPECTED  Discussed 0-10 pain scale and available prn pain medications with pt. Pt states pain at acceptable level at this time

## 2017-07-14 NOTE — PROGRESS NOTES
0705-Report received at bedside from Qian CAI, assumed care of patient. Encouraged to call with needs, denies at this time.   0946-Assessment completed, fundus is firm, lochia light and vital signs within defined parameters. Patient is ambulating and voiding without difficulty. Bonding well with infant, breastfeeding independently.  Discussed with patient pain medication plan, patient requesting to be medicated PRN, will call for medication.  Family at bedside.  Plan of care discussed, patient verbalized understanding. Hourly rounding implemented, call light within reach.

## 2017-07-14 NOTE — PROGRESS NOTES
"SHABBIR @   Baby Girl \"Sallie\"  Apgars 8/9  Weight pending  No laceration  EBL 300cc  Cytotect 800mcg [place]    "

## 2017-07-14 NOTE — PROGRESS NOTES
"PPD#1    S: Doing well. Pain controlled. Lochia normal     O: Blood pressure 109/67, pulse 82, temperature 36.6 °C (97.8 °F), temperature source Temporal, resp. rate 18, height 1.549 m (5' 1\"), weight 68.947 kg (152 lb), SpO2 95 %.  Gen: NAD  Fundus firm below the umbilicus  Ext: no c/c/e    Labs: RH+, RI, GBS positive, Hct 35-->33    A/P 25yo  s/p   1. Routine pp care  2. Home tomorrow. GBS pos  "

## 2017-07-15 VITALS
HEART RATE: 100 BPM | DIASTOLIC BLOOD PRESSURE: 69 MMHG | WEIGHT: 152 LBS | TEMPERATURE: 98 F | RESPIRATION RATE: 18 BRPM | BODY MASS INDEX: 28.7 KG/M2 | OXYGEN SATURATION: 97 % | HEIGHT: 61 IN | SYSTOLIC BLOOD PRESSURE: 113 MMHG

## 2017-07-15 PROCEDURE — 700112 HCHG RX REV CODE 229: Performed by: OBSTETRICS & GYNECOLOGY

## 2017-07-15 PROCEDURE — A9270 NON-COVERED ITEM OR SERVICE: HCPCS | Performed by: OBSTETRICS & GYNECOLOGY

## 2017-07-15 PROCEDURE — 700102 HCHG RX REV CODE 250 W/ 637 OVERRIDE(OP): Performed by: OBSTETRICS & GYNECOLOGY

## 2017-07-15 RX ORDER — OXYCODONE HYDROCHLORIDE AND ACETAMINOPHEN 5; 325 MG/1; MG/1
1 TABLET ORAL EVERY 4 HOURS PRN
Qty: 15 TAB | Refills: 0 | Status: ON HOLD | OUTPATIENT
Start: 2017-07-15 | End: 2019-10-25

## 2017-07-15 RX ORDER — IBUPROFEN 600 MG/1
600 TABLET ORAL EVERY 6 HOURS PRN
Qty: 30 TAB | Refills: 2 | Status: ON HOLD | OUTPATIENT
Start: 2017-07-15 | End: 2019-10-25

## 2017-07-15 RX ADMIN — Medication 1 TABLET: at 09:15

## 2017-07-15 RX ADMIN — OXYCODONE HYDROCHLORIDE AND ACETAMINOPHEN 1 TABLET: 5; 325 TABLET ORAL at 09:26

## 2017-07-15 RX ADMIN — DOCUSATE SODIUM 100 MG: 100 CAPSULE ORAL at 09:16

## 2017-07-15 RX ADMIN — IBUPROFEN 600 MG: 600 TABLET, FILM COATED ORAL at 09:15

## 2017-07-15 RX ADMIN — OXYCODONE HYDROCHLORIDE AND ACETAMINOPHEN 1 TABLET: 5; 325 TABLET ORAL at 01:29

## 2017-07-15 ASSESSMENT — PAIN SCALES - GENERAL
PAINLEVEL_OUTOF10: 4
PAINLEVEL_OUTOF10: 4
PAINLEVEL_OUTOF10: 7
PAINLEVEL_OUTOF10: 3

## 2017-07-15 NOTE — CONSULTS
Spoke with parents regarding breastfeeding. Parents aware of infant's 7% weight loss and will have baby seen by Pediatrician in 2 days. Mom nursed her 1st baby for 14 mos. And had a good milk supply. Mom encouraged to call for assistance at next feeding.

## 2017-07-15 NOTE — DISCHARGE INSTRUCTIONS
POSTPARTUM DISCHARGE INSTRUCTIONS FOR MOM    YOB: 1990   Age: 26 y.o.               Admit Date: 7/13/2017     Discharge Date: 7/15/2017  Attending Doctor:  Gena Mas M.D.                  Allergies:  Review of patient's allergies indicates no known allergies.    Discharged to home by car. Discharged via wheelchair, hospital escort: Yes.  Special equipment needed: Not Applicable  Belongings with: Personal  Be sure to schedule a follow-up appointment with your primary care doctor or any specialists as instructed.     Discharge Plan:   Diet Plan: Discussed  Activity Level: Discussed  Confirmed Follow up Appointment: Appointment Scheduled  Confirmed Symptoms Management: Discussed  Medication Reconciliation Updated: Yes  Influenza Vaccine Indication: Indicated: Not available from distributor/    REASONS TO CALL YOUR OBSTETRICIAN:  1.   Persistent fever or shaking chills (Temperature higher than 100.4)  2.   Heavy bleeding (soaking more than 1 pad per hour); Passing clots  3.   Foul odor from vagina  4.   Mastitis (Breast infection; breast pain, chills, fever, redness)  5.   Urinary pain, burning or frequency  6.   Episiotomy infection  7.   Abdominal incision infection  8.   Severe depression longer than 24 hours    HAND WASHING  · Prior to handling the baby.  · Before breastfeeding or bottle feeding baby.  · After using the bathroom or changing the baby's diaper.    VAGINAL CARE  · Nothing inside vagina for 6 weeks: no sexual intercourse, tampons or douching.  · Bleeding may continue for 2-4 weeks.  Amount may vary.    · Call your physician for heavy bleeding which means soaking more than 1 pad per hour    BIRTH CONTROL  · It is possible to become pregnant at any time after delivery and while breastfeeding.  · Plan to discuss a method of birth control with your physician at your follow up visit. visit.    DIET AND ELIMINATION  · Eating more fiber (bran cereal, fruits, and vegetables) and  "drinking plenty of fluids will help to avoid constipation.  · Urinary frequency after childbirth is normal.    POSTPARTUM BLUES  During the first few days after birth, you may experience a sense of the \"blues\" which may include impatience, irritability or even crying.  These feeling come and go quickly.  However, as many as 1 in 10 women experience emotional symptoms known as postpartum depression.    Postpartum depression:  May start as early as the second or third day after delivery or take several weeks or months to develop.  Symptoms of \"blues\" are present, but are more intense:  Crying spells; loss of appetite; feelings of hopelessness or loss of control; fear of touching the baby; over concern or no concern at all about the baby; little or no concern about your own appearance/caring for yourself; and/or inability to sleep or excessive sleeping.  Contact your physician if you are experiencing any of these symptoms.    Crisis Hotline:  · [place] Crisis Hotline:  7-326-HDBLCYD  Or 1-160.591.7973  · [place] Crisis Hotline:  1-716.125.7574  Or 031-683-1527    PREVENTING SHAKEN BABY:  If you are angry or stressed, PUT THE BABY IN THE CRIB, step away, take some deep breaths, and wait until you are calm to care for the baby.  DO NOT SHAKE THE BABY.  You are not alone, call a supporter for help.    · Crisis Call Center 24/7 crisis line 501-636-7146 or 1-585.686.9678  · You can also text them, text \"ANSWER\" to 160497    QUIT SMOKING/TOBACCO USE:  I understand the use of any tobacco products increases my chance of suffering from future heart disease and could cause other illnesses which may shorten my life. Quitting the use of tobacco products is the single most important thing I can do to improve my health. For further information on smoking / tobacco cessation call a Toll Free Quit Line at 1-946.274.1470 (*[employer] [place]) or 1-936.268.4644 (American Lung Association) or you can access the web based program " at www.lungusa.org.    · [place] Tobacco Users Help Line:  (759) 308-4674       Toll Free: 1-957.175.6788  · Quit Tobacco Program [facility] (228)358-1766    DEPRESSION / SUICIDE RISK:  As you are discharged from this [facility], it is important to learn how to keep safe from harming yourself.    Recognize the warning signs:  · Abrupt changes in personality, positive or negative- including increase in energy   · Giving away possessions  · Change in eating patterns- significant weight changes-  positive or negative  · Change in sleeping patterns- unable to sleep or sleeping all the time   · Unwillingness or inability to communicate  · Depression  · Unusual sadness, discouragement and loneliness  · Talk of wanting to die  · Neglect of personal appearance   · Rebelliousness- reckless behavior  · Withdrawal from people/activities they love  · Confusion- inability to concentrate     If you or a loved one observes any of these behaviors or has concerns about self-harm, here's what you can do:  · Talk about it- your feelings and reasons for harming yourself  · Remove any means that you might use to hurt yourself (examples: pills, rope, extension cords, firearm)  · Get professional help from the community (Mental Health, Substance Abuse, psychological counseling)  · Do not be alone:Call your Safe Contact- someone whom you trust who will be there for you.  · Call your local CRISIS HOTLINE 271-3248 or 902-077-3377  · Call your local Children's Mobile Crisis Response Team [place] (633) 349-5080 or www.[employer]ytronUS  · Call the toll free National Suicide Prevention Hotlines   · National Suicide Prevention Lifeline 857-526-INLW (3770)  · National Hope Line Network 800-SUICIDE (651-6569)    DISCHARGE SURVEY:  Thank you for choosing [facility].  We hope we provided you with very good care.  You may be receiving a survey in the mail.  Please fill it out.  Your opinion is valuable to  us.    ADDITIONAL EDUCATIONAL MATERIALS GIVEN TO PATIENT:        My signature on this form indicates that:  1.  I have reviewed and understand the above information  2.  My questions regarding this information have been answered to my satisfaction.  3.  I have formulated a plan with my discharge nurse to obtain my prescribed medication for home.

## 2017-07-15 NOTE — DISCHARGE SUMMARY
CHIEF COMPLAINT ON ADMISSION  No chief complaint on file.      CODE STATUS  Full Code    HPI & HOSPITAL COURSE  This is a 26 y.o. female here with pregnancy, induction, and     Therefore, she is discharged in good and stable condition with close outpatient follow-up.    SPECIFIC OUTPATIENT FOLLOW-UP  6 weeks    DISCHARGE PROBLEM LIST  Active Problems:    * No active hospital problems. *  Resolved Problems:    * No resolved hospital problems. *      FOLLOW UP:  6 weeks with Dr Mas      MEDICATIONS ON DISCHARGE   Amy Ireland   Home Medication Instructions COLEMAN:[id number]    Printed on:07/15/17 0650   Medication Information                      ibuprofen (MOTRIN) 600 MG Tab  Take 1 Tab by mouth every 6 hours as needed (For cramping after delivery; do not give if patient is receiving ketorolac (Toradol)).             oxycodone-acetaminophen (PERCOCET) 5-325 MG Tab  Take 1 Tab by mouth every four hours as needed (for Moderate Pain (Pain Scale 4-6) after delivery).             Prenatal MV-Min-Fe Fum-FA-DHA (PRENATAL 1 PO)  Take  by mouth.             ranitidine (ZANTAC) 150 MG Tab  Take 150 mg by mouth 2 times a day. Indications: Heartburn                 DIET  Orders Placed This Encounter   Procedures   • Diet Order     Standing Status: Standing      Number of Occurrences: 1      Standing Expiration Date:      Order Specific Question:  Diet:     Answer:  Regular [1]     Order Specific Question:  Miscellaneous modifications:     Answer:  New Mom [16]       ACTIVITY  As tolerated.  n/a      CONSULTATIONS  none    PROCEDURES      LABORATORY  Lab Results   Component Value Date/Time    SODIUM 136 2017 12:37 PM    POTASSIUM 3.6 2017 12:37 PM    CHLORIDE 104 2017 12:37 PM    CO2 24 2017 12:37 PM    GLUCOSE 73 2017 12:37 PM    BUN 8 2017 12:37 PM    CREATININE 0.51 2017 12:37 PM        Lab Results   Component Value Date/Time    WBC 16.5* 2017 03:57 AM    HEMOGLOBIN  10.9* 07/14/2017 03:57 AM    HEMATOCRIT 33.7* 07/14/2017 03:57 AM    PLATELET COUNT 186 07/14/2017 03:57 AM

## 2017-07-15 NOTE — PROGRESS NOTES
Assisted with BF attempt, baby very fussy, after multiple attempts achieved deep latch with widely-flanged lips on left breast, mother denies pain when BF, strong and effective suck noted, baby BF 15 minutes. Attempt to latch on right breast, baby fussy, left [id number].    Infant weight down 7%, discussed concern over 7% weight loss in 26 hours with parents.    Mother has personal Medela pump at home, plan is to BF Q 2-3 hours, if no/suboptimal latch mother will pump and supplement per guidelines, supplement guidelines provided and explained.    Educated on outpatient assistance available at [facility], encouraged to schedule outpatient consult if needed/desired.

## 2017-07-15 NOTE — PROGRESS NOTES
0715-Report received at bedside from Kelin CAI, assumed care of patient. Encouraged to call with needs, denies at this time.   0926-Assessment completed, fundus is firm, lochia light and vital signs within defined parameters. Patient is ambulating and voiding without difficulty. Bonding well with infant, breastfeeding independently.  Discussed with patient pain medication plan, patient requesting to be medicated PRN, will call for medication.  Family at bedside.  Plan of care discussed, patient verbalized understanding. Hourly rounding implemented, call light within reach.

## 2017-08-13 ENCOUNTER — OFFICE VISIT (OUTPATIENT)
Dept: URGENT CARE | Facility: PHYSICIAN GROUP | Age: 27
End: 2017-08-13
Payer: COMMERCIAL

## 2017-08-13 VITALS
WEIGHT: 136 LBS | RESPIRATION RATE: 14 BRPM | OXYGEN SATURATION: 94 % | SYSTOLIC BLOOD PRESSURE: 102 MMHG | HEART RATE: 111 BPM | DIASTOLIC BLOOD PRESSURE: 60 MMHG | TEMPERATURE: 97.7 F | HEIGHT: 61 IN | BODY MASS INDEX: 25.68 KG/M2

## 2017-08-13 DIAGNOSIS — N61.0 ACUTE MASTITIS OF LEFT BREAST: ICD-10-CM

## 2017-08-13 PROCEDURE — 99204 OFFICE O/P NEW MOD 45 MIN: CPT | Performed by: FAMILY MEDICINE

## 2017-08-13 RX ORDER — AMOXICILLIN AND CLAVULANATE POTASSIUM 875; 125 MG/1; MG/1
1 TABLET, FILM COATED ORAL 2 TIMES DAILY
Qty: 14 TAB | Refills: 0 | Status: SHIPPED | OUTPATIENT
Start: 2017-08-13 | End: 2017-08-20

## 2017-08-13 NOTE — MR AVS SNAPSHOT
"        Amy Ireland   2017 3:30 PM   Office Visit   MRN: [id number]    Department:  [facility]   Dept Phone:  [phone]    Description:  Female : 1990   Provider:  Damien Khalil M.D.           Reason for Visit     Breast Pain poss mastitis, fever      Allergies as of 2017     No Known Allergies      You were diagnosed with     Acute mastitis of left breast   [[id number]]         Vital Signs     Blood Pressure Pulse Temperature Respirations Height Weight    102/60 mmHg 111 36.5 °C (97.7 °F) 14 1.549 m (5' 1\") 61.689 kg (136 lb)    Body Mass Index Oxygen Saturation Smoking Status             25.71 kg/m2 94% Never Smoker          Basic Information     Date Of Birth Sex Race Ethnicity Preferred Language    1990 Female White Non- English      Problem List              ICD-10-CM Priority Class Noted - Resolved    Dyspareunia, female N94.10   2013 - Present      Health Maintenance        Date Due Completion Dates    IMM HEP B VACCINE (1 of 3 - Primary Series) 1992 ---    IMM HEP A VACCINE (1 of 2 - Standard Series) 1993 ---    IMM HPV VACCINE (1 of 3 - Female 3 Dose Series) 2003 ---    IMM VARICELLA (CHICKENPOX) VACCINE (1 of 2 - 2 Dose Adolescent Series) 2005 ---    IMM DTaP/Tdap/Td Vaccine (1 - Tdap) 2011 ---    PAP SMEAR 2013    IMM INFLUENZA (1) 2017 10/11/2016, 10/5/2015, 11/3/2014, 10/30/2013, 10/15/2012, 2012            Current Immunizations     Influenza TIV (IM) 10/30/2013, 10/15/2012, 2012    Influenza Vaccine Quad Inj (Pf) 10/11/2016, 10/5/2015 10:25 AM, 11/3/2014      Below and/or attached are the medications your provider expects you to take. Review all of your home medications and newly ordered medications with your provider and/or pharmacist. Follow medication instructions as directed by your provider and/or pharmacist. Please keep your medication list with you and share with your provider. Update the " information when medications are discontinued, doses are changed, or new medications (including over-the-counter products) are added; and carry medication information at all times in the event of emergency situations     Allergies:  No Known Allergies          Medications  Valid as of: August 13, 2017 -  4:33 PM    Generic Name Brand Name Tablet Size Instructions for use    Amoxicillin-Pot Clavulanate (Tab) AUGMENTIN 875-125 MG Take 1 Tab by mouth 2 times a day for 7 days.        Ibuprofen (Tab) MOTRIN 600 MG Take 1 Tab by mouth every 6 hours as needed (For cramping after delivery; do not give if patient is receiving ketorolac (Toradol)).        Oxycodone-Acetaminophen (Tab) PERCOCET 5-325 MG Take 1 Tab by mouth every four hours as needed (for Moderate Pain (Pain Scale 4-6) after delivery).        Prenatal MV-Min-Fe Fum-FA-DHA   Take  by mouth.        RaNITidine HCl (Tab) ZANTAC 150 MG Take 150 mg by mouth 2 times a day. Indications: Heartburn        .                 Medicines prescribed today were sent to:     [facility] [address] [place] [id number]    Phone: [phone] Fax: [phone]    Open 24 Hours?: No      Medication refill instructions:       If your prescription bottle indicates you have medication refills left, it is not necessary to call your provider’s office. Please contact your pharmacy and they will refill your medication.    If your prescription bottle indicates you do not have any refills left, you may request refills at any time through one of the following ways: The online [employer] system (except Urgent Care), by calling your provider’s office, or by asking your pharmacy to contact your provider’s office with a refill request. Medication refills are processed only during regular business hours and may not be available until the next business day. Your provider may request additional information or to have a follow-up visit with you  prior to refilling your medication.   *Please Note: Medication refills are assigned a new Rx number when refilled electronically. Your pharmacy may indicate that no refills were authorized even though a new prescription for the same medication is available at the pharmacy. Please request the medicine by name with the pharmacy before contacting your provider for a refill.        Instructions    Mastitis  Mastitis is inflammation of the breast tissue. It occurs most often in women who are breastfeeding, but it can also affect other women, and even sometimes men.  CAUSES   Mastitis is usually caused by a bacterial infection. Bacteria enter the breast tissue through cuts or openings in the skin. Typically, this occurs with breastfeeding because of cracked or irritated skin. Sometimes, it can occur even when there is no opening in the skin. It can be associated with plugged milk (lactiferous) ducts. Nipple piercing can also lead to mastitis. Also, some forms of breast cancer can cause mastitis.  SIGNS AND SYMPTOMS   · Swelling, redness, tenderness, and pain in an area of the breast.  · Swelling of the glands under the arm on the same side.  · Fever.  If an infection is allowed to progress, a collection of pus (abscess) may develop.  DIAGNOSIS   Your health care provider can usually diagnose mastitis based on your symptoms and a physical exam. Tests may be done to help confirm the diagnosis. These may include:   · Removal of pus from the breast by applying pressure to the area. This pus can be examined in the lab to determine which bacteria are present. If an abscess has developed, the fluid in the abscess can be removed with a needle. This can also be used to confirm the diagnosis and determine the bacteria present. In most cases, pus will not be present.  · Blood tests to determine if your body is fighting a bacterial infection.  · Mammogram or ultrasound tests to rule out other problems or diseases.  TREATMENT      Antibiotic medicine is used to treat a bacterial infection. Your health care provider will determine which bacteria are most likely causing the infection and will select an appropriate antibiotic. This is sometimes changed based on the results of tests performed to identify the bacteria, or if there is no response to the antibiotic selected. Antibiotics are usually given by mouth. You may also be given medicine for pain.  Mastitis that occurs with breastfeeding will sometimes go away on its own, so your health care provider may choose to wait 24 hours after first seeing you to decide whether a prescription medicine is needed.  HOME CARE INSTRUCTIONS   · Only take over-the-counter or prescription medicines for pain, fever, or discomfort as directed by your health care provider.  · If your health care provider prescribed an antibiotic, take the medicine as directed. Make sure you finish it even if you start to feel better.  · Do not wear a tight or underwire bra. Wear a soft, supportive bra.  · Increase your fluid intake, especially if you have a fever.  · Women who are breastfeeding should follow these instructions:  ¨ Continue to empty the breast. Your health care provider can tell you whether this milk is safe for your infant or needs to be thrown out. You may be told to stop nursing until your health care provider thinks it is safe for your baby. Use a breast pump if you are advised to stop nursing.  ¨ Keep your nipples clean and dry.  ¨ Empty the first breast completely before going to the other breast. If your baby is not emptying your breasts completely for some reason, use a breast pump to empty your breasts.  ¨ If you go back to work, pump your breasts while at work to stay in time with your nursing schedule.  ¨ Avoid allowing your breasts to become overly filled with milk (engorged).  SEEK MEDICAL CARE IF:   · You have pus-like discharge from the breast.  · Your symptoms do not improve with the treatment  prescribed by your health care provider within 2 days.  SEEK IMMEDIATE MEDICAL CARE IF:   · Your pain and swelling are getting worse.  · You have pain that is not controlled with medicine.  · You have a red line extending from the breast toward your armpit.  · You have a fever or persistent symptoms for more than 2-3 days.  · You have a fever and your symptoms suddenly get worse.     This information is not intended to replace advice given to you by your health care provider. Make sure you discuss any questions you have with your health care provider.     Document Released: 12/18/2006 Document Revised: 12/23/2014 Document Reviewed: 07/18/2014  [employer] Interactive Patient Education ©2016 [employer]vier Inc.            [employer] Access Code: Activation code not generated  Current [employer] Status: Active

## 2017-08-30 ASSESSMENT — ENCOUNTER SYMPTOMS
DIZZINESS: 0
EYE PAIN: 0
BREAST PAIN: 1
SHORTNESS OF BREATH: 0
NAUSEA: 0
HEADACHES: 0
MYALGIAS: 0
VOMITING: 0
CHILLS: 1
SORE THROAT: 0
FEVER: 1

## 2017-08-30 NOTE — PROGRESS NOTES
Subjective:      Amy Ireland is a 26 y.o. female who presents with Breast Pain (poss mastitis, fever)            Breast Pain   This is a new problem. The current episode started in the past 7 days. The problem occurs constantly. The problem has been gradually worsening. Associated symptoms include chills and a fever. Pertinent negatives include no chest pain, headaches, myalgias, nausea, rash, sore throat or vomiting.       Review of Systems   Constitutional: Positive for chills and fever.   HENT: Negative for sore throat.    Eyes: Negative for pain.   Respiratory: Negative for shortness of breath.    Cardiovascular: Negative for chest pain.   Gastrointestinal: Negative for nausea and vomiting.   Genitourinary: Negative for hematuria.   Musculoskeletal: Negative for myalgias.   Skin: Negative for rash.   Neurological: Negative for dizziness and headaches.     PMH:  has a past medical history of Dyspareunia, female (6/20/2013) and Pregnant state, incidental. She also has no past medical history of Allergy.  MEDS:   Current Outpatient Prescriptions:   •  oxycodone-acetaminophen (PERCOCET) 5-325 MG Tab, Take 1 Tab by mouth every four hours as needed (for Moderate Pain (Pain Scale 4-6) after delivery)., Disp: 15 Tab, Rfl: 0  •  ibuprofen (MOTRIN) 600 MG Tab, Take 1 Tab by mouth every 6 hours as needed (For cramping after delivery; do not give if patient is receiving ketorolac (Toradol))., Disp: 30 Tab, Rfl: 2  •  Prenatal MV-Min-Fe Fum-FA-DHA (PRENATAL 1 PO), Take  by mouth., Disp: , Rfl:   •  ranitidine (ZANTAC) 150 MG Tab, Take 150 mg by mouth 2 times a day. Indications: Heartburn, Disp: , Rfl:   ALLERGIES: No Known Allergies  SURGHX: No past surgical history on file.  SOCHX:  reports that she has never smoked. She has never used smokeless tobacco. She reports that she does not drink alcohol or use drugs.  FH: family history includes Cancer in her maternal grandmother; Hypertension in her maternal grandmother.  "     Objective:     /60   Pulse (!) 111   Temp 36.5 °C (97.7 °F)   Resp 14   Ht 1.549 m (5' 1\")   Wt 61.7 kg (136 lb)   SpO2 94%   BMI 25.70 kg/m²      Physical Exam   Constitutional: She is oriented to person, place, and time. She appears well-developed and well-nourished. No distress.   HENT:   Head: Normocephalic and atraumatic.   Eyes: Conjunctivae and EOM are normal. Pupils are equal, round, and reactive to light.   Cardiovascular: Normal rate and regular rhythm.    No murmur heard.  Pulmonary/Chest: Effort normal and breath sounds normal. No respiratory distress. Left breast exhibits tenderness. Left breast exhibits no nipple discharge. There is breast swelling.   Abdominal: Soft. She exhibits no distension. There is no tenderness.   Neurological: She is alert and oriented to person, place, and time. She has normal reflexes. No sensory deficit.   Skin: Skin is warm and dry.   Psychiatric: She has a normal mood and affect.               Assessment/Plan:     1. Acute mastitis of left breast  Differential diagnosis, natural history, supportive care, and indications for immediate follow-up discussed.   - amoxicillin-clavulanate (AUGMENTIN) 875-125 MG Tab; Take 1 Tab by mouth 2 times a day for 7 days.  Dispense: 14 Tab; Refill: 0      "

## 2017-10-01 ENCOUNTER — IMMUNIZATION (OUTPATIENT)
Dept: OCCUPATIONAL MEDICINE | Facility: CLINIC | Age: 27
End: 2017-10-01

## 2017-10-01 DIAGNOSIS — Z23 NEED FOR VACCINATION: ICD-10-CM

## 2017-10-01 PROCEDURE — 90686 IIV4 VACC NO PRSV 0.5 ML IM: CPT | Performed by: PREVENTIVE MEDICINE

## 2018-08-01 ENCOUNTER — NON-PROVIDER VISIT (OUTPATIENT)
Dept: URGENT CARE | Facility: PHYSICIAN GROUP | Age: 28
End: 2018-08-01

## 2018-08-01 DIAGNOSIS — Z11.1 PPD SCREENING TEST: ICD-10-CM

## 2018-08-01 PROCEDURE — 86580 TB INTRADERMAL TEST: CPT | Performed by: FAMILY MEDICINE

## 2018-08-03 ENCOUNTER — NON-PROVIDER VISIT (OUTPATIENT)
Dept: URGENT CARE | Facility: PHYSICIAN GROUP | Age: 28
End: 2018-08-03

## 2018-08-03 LAB — TB WHEAL 3D P 5 TU DIAM: NORMAL MM

## 2018-10-22 ENCOUNTER — OFFICE VISIT (OUTPATIENT)
Dept: URGENT CARE | Facility: PHYSICIAN GROUP | Age: 28
End: 2018-10-22
Payer: COMMERCIAL

## 2018-10-22 ENCOUNTER — HOSPITAL ENCOUNTER (OUTPATIENT)
Dept: RADIOLOGY | Facility: MEDICAL CENTER | Age: 28
End: 2018-10-22
Attending: PHYSICIAN ASSISTANT
Payer: COMMERCIAL

## 2018-10-22 VITALS
WEIGHT: 125 LBS | TEMPERATURE: 97.2 F | SYSTOLIC BLOOD PRESSURE: 102 MMHG | OXYGEN SATURATION: 96 % | HEIGHT: 61 IN | RESPIRATION RATE: 16 BRPM | HEART RATE: 76 BPM | DIASTOLIC BLOOD PRESSURE: 68 MMHG | BODY MASS INDEX: 23.6 KG/M2

## 2018-10-22 DIAGNOSIS — R05.9 COUGH: ICD-10-CM

## 2018-10-22 DIAGNOSIS — J98.8 RTI (RESPIRATORY TRACT INFECTION): ICD-10-CM

## 2018-10-22 LAB
INT CON NEG: NEGATIVE
INT CON POS: POSITIVE
POC URINE PREGNANCY TEST: NEGATIVE

## 2018-10-22 PROCEDURE — 81025 URINE PREGNANCY TEST: CPT | Performed by: PHYSICIAN ASSISTANT

## 2018-10-22 PROCEDURE — 99214 OFFICE O/P EST MOD 30 MIN: CPT | Performed by: PHYSICIAN ASSISTANT

## 2018-10-22 PROCEDURE — 71046 X-RAY EXAM CHEST 2 VIEWS: CPT

## 2018-10-22 RX ORDER — CODEINE PHOSPHATE AND GUAIFENESIN 10; 100 MG/5ML; MG/5ML
5 SOLUTION ORAL EVERY 4 HOURS PRN
Qty: 100 ML | Refills: 0 | Status: SHIPPED | OUTPATIENT
Start: 2018-10-22 | End: 2018-10-27

## 2018-10-22 RX ORDER — AZITHROMYCIN 250 MG/1
TABLET, FILM COATED ORAL
Qty: 6 TAB | Refills: 0 | Status: ON HOLD | OUTPATIENT
Start: 2018-10-22 | End: 2019-10-25

## 2018-10-22 ASSESSMENT — ENCOUNTER SYMPTOMS
COUGH: 1
FEVER: 0
DIZZINESS: 0
RHINORRHEA: 0
HEADACHES: 1
MYALGIAS: 1
CHILLS: 0
WHEEZING: 0
CARDIOVASCULAR NEGATIVE: 1
GASTROINTESTINAL NEGATIVE: 1
BACK PAIN: 0
PALPITATIONS: 0
SPUTUM PRODUCTION: 1
SORE THROAT: 1
SHORTNESS OF BREATH: 1
SINUS PAIN: 0

## 2018-10-22 NOTE — PROGRESS NOTES
Subjective:      Amy Ireland is a 28 y.o. female who presents with Cough (Wet/dry cough,ear pain, pain in Rt rib from coughing 1.5month)            Cough   This is a new problem. The current episode started more than 1 month ago. The problem has been unchanged. The problem occurs every few minutes. The cough is productive of sputum. Associated symptoms include ear congestion, ear pain, headaches, myalgias, nasal congestion, a sore throat and shortness of breath. Pertinent negatives include no chest pain, chills, fever, postnasal drip, rhinorrhea or wheezing. The symptoms are aggravated by lying down. She has tried nothing for the symptoms. The treatment provided no relief. There is no history of asthma or pneumonia.   Cough coming on for a month and a half. Everybody in house has been sick. She states now she is having right sided chest wall pain.       PMH:  has a past medical history of Dyspareunia, female (6/20/2013) and Pregnant state, incidental. She also has no past medical history of Allergy.  MEDS:   Current Outpatient Prescriptions:   •  oxycodone-acetaminophen (PERCOCET) 5-325 MG Tab, Take 1 Tab by mouth every four hours as needed (for Moderate Pain (Pain Scale 4-6) after delivery)., Disp: 15 Tab, Rfl: 0  •  ibuprofen (MOTRIN) 600 MG Tab, Take 1 Tab by mouth every 6 hours as needed (For cramping after delivery; do not give if patient is receiving ketorolac (Toradol)). (Patient not taking: Reported on 10/22/2018), Disp: 30 Tab, Rfl: 2  •  Prenatal MV-Min-Fe Fum-FA-DHA (PRENATAL 1 PO), Take  by mouth., Disp: , Rfl:   •  ranitidine (ZANTAC) 150 MG Tab, Take 150 mg by mouth 2 times a day. Indications: Heartburn, Disp: , Rfl:   ALLERGIES: No Known Allergies  SURGHX: History reviewed. No pertinent surgical history.  SOCHX:  reports that she has never smoked. She has never used smokeless tobacco. She reports that she does not drink alcohol or use drugs.  FH: family history includes Cancer in her maternal  "grandmother; Hypertension in her maternal grandmother.    Review of Systems   Constitutional: Negative for chills and fever.   HENT: Positive for ear pain and sore throat. Negative for congestion, postnasal drip, rhinorrhea and sinus pain.    Respiratory: Positive for cough, sputum production and shortness of breath. Negative for wheezing.    Cardiovascular: Negative.  Negative for chest pain, palpitations and leg swelling.   Gastrointestinal: Negative.    Musculoskeletal: Positive for myalgias. Negative for back pain.   Neurological: Positive for headaches. Negative for dizziness.       Medications, Allergies, and current problem list reviewed today in Epic     Objective:     /68 (BP Location: Left arm, Patient Position: Sitting, BP Cuff Size: Adult)   Pulse 76   Temp 36.2 °C (97.2 °F) (Temporal)   Resp 16   Ht 1.549 m (5' 1\")   Wt 56.7 kg (125 lb)   SpO2 96%   Breastfeeding? No   BMI 23.62 kg/m²      Physical Exam   Constitutional: She is oriented to person, place, and time. She appears well-developed and well-nourished. No distress.   HENT:   Head: Normocephalic and atraumatic.   Right Ear: Tympanic membrane and external ear normal.   Left Ear: Tympanic membrane and external ear normal.   Nose: Nose normal.   Mouth/Throat: Oropharynx is clear and moist. No oropharyngeal exudate.   Eyes: Pupils are equal, round, and reactive to light. Conjunctivae and EOM are normal. Right eye exhibits no discharge. Left eye exhibits no discharge.   Neck: Normal range of motion. Neck supple.   Cardiovascular: Normal rate, regular rhythm and normal heart sounds.    Pulmonary/Chest: Effort normal and breath sounds normal. No respiratory distress. She has no wheezes. Chest wall is not dull to percussion. She exhibits tenderness. She exhibits no bony tenderness, no edema, no deformity, no swelling and no retraction.       Musculoskeletal: Normal range of motion.   Lymphadenopathy:     She has no cervical adenopathy. "   Neurological: She is alert and oriented to person, place, and time.   Skin: Skin is warm and dry. She is not diaphoretic.   Psychiatric: She has a normal mood and affect. Her behavior is normal. Judgment and thought content normal.   Nursing note and vitals reviewed.              Assessment/Plan:     1. Cough  DX-CHEST-2 VIEWS    POCT Pregnancy    guaifenesin-codeine (ROBITUSSIN AC) Solution oral solution   2. RTI (respiratory tract infection)  azithromycin (ZITHROMAX) 250 MG Tab    guaifenesin-codeine (ROBITUSSIN AC) Solution oral solution     Xray: Negative by my read. Radiology review pending.    6 week history of productive cough with congestion. Some shortness of breath. Over the last few days she has felt right-sided chest wall pain. Pain is reproducible. Worse with coughing and deep breathing. She denies any fevers, chills, wheezing. Exam shows clear lungs bilaterally and reproducible right-sided rib tenderness. Her vital signs are normal, PO2 adequate, pulse and temperature normal. She denies any lower leg swelling or tenderness. No recent travel. X-ray was negative for any acute cardiopulmonary etiology. No signs of vascular disorder. She does have sick contacts at home.  She'll be treated for an ongoing respiratory tract infection with Zithromax  Cough medicine at night  [facility] Aware web site evaluation: I have obtained and reviewed patient utilization report from [facility] pharmacy database prior to writing prescription for controlled substance II, III or IV. Based on the report and my clinical assessment the prescription is medically necessary.   Patient is cautioned on sedation potential of narcotic medication; no drinking, driving or operating heavy machinery while on this medication.    Return to clinic or go to ED if symptoms worsen or persist. Indications for ED discussed at length. Patient voices understanding. Follow-up with your primary care provider in 3-5 days. Red flags discussed. All  side effects of medication discussed including allergic response, GI upset, tendon injury, etc.    Please note that this dictation was created using voice recognition software. I have made every reasonable attempt to correct obvious errors, but I expect that there are errors of grammar and possibly content that I did not discover before finalizing the note.

## 2019-07-30 ENCOUNTER — HOSPITAL ENCOUNTER (OUTPATIENT)
Dept: LAB | Facility: MEDICAL CENTER | Age: 29
End: 2019-07-30
Attending: OBSTETRICS & GYNECOLOGY
Payer: COMMERCIAL

## 2019-07-30 LAB
ERYTHROCYTE [DISTWIDTH] IN BLOOD BY AUTOMATED COUNT: 46 FL (ref 35.9–50)
GLUCOSE 1H P 50 G GLC PO SERPL-MCNC: 99 MG/DL (ref 70–139)
HCT VFR BLD AUTO: 37.3 % (ref 37–47)
HGB BLD-MCNC: 11.9 G/DL (ref 12–16)
MCH RBC QN AUTO: 32.9 PG (ref 27–33)
MCHC RBC AUTO-ENTMCNC: 31.9 G/DL (ref 33.6–35)
MCV RBC AUTO: 103 FL (ref 81.4–97.8)
PLATELET # BLD AUTO: 244 K/UL (ref 164–446)
PMV BLD AUTO: 9.5 FL (ref 9–12.9)
RBC # BLD AUTO: 3.62 M/UL (ref 4.2–5.4)
TREPONEMA PALLIDUM IGG+IGM AB [PRESENCE] IN SERUM OR PLASMA BY IMMUNOASSAY: NON REACTIVE
WBC # BLD AUTO: 10 K/UL (ref 4.8–10.8)

## 2019-07-30 PROCEDURE — 36415 COLL VENOUS BLD VENIPUNCTURE: CPT

## 2019-07-30 PROCEDURE — 86780 TREPONEMA PALLIDUM: CPT

## 2019-07-30 PROCEDURE — 82950 GLUCOSE TEST: CPT

## 2019-07-30 PROCEDURE — 85027 COMPLETE CBC AUTOMATED: CPT

## 2019-10-08 ENCOUNTER — HOSPITAL ENCOUNTER (OUTPATIENT)
Facility: MEDICAL CENTER | Age: 29
End: 2019-10-08
Attending: OBSTETRICS & GYNECOLOGY | Admitting: OBSTETRICS & GYNECOLOGY
Payer: COMMERCIAL

## 2019-10-08 VITALS
WEIGHT: 151 LBS | RESPIRATION RATE: 16 BRPM | SYSTOLIC BLOOD PRESSURE: 112 MMHG | DIASTOLIC BLOOD PRESSURE: 71 MMHG | BODY MASS INDEX: 28.51 KG/M2 | HEART RATE: 113 BPM | TEMPERATURE: 97.6 F | HEIGHT: 61 IN

## 2019-10-08 LAB — CRYSTALS AMN MICRO: NORMAL

## 2019-10-08 PROCEDURE — 89060 EXAM SYNOVIAL FLUID CRYSTALS: CPT

## 2019-10-08 PROCEDURE — 59025 FETAL NON-STRESS TEST: CPT
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
## 2019-10-25 ENCOUNTER — ANESTHESIA (OUTPATIENT)
Dept: ANESTHESIOLOGY | Facility: MEDICAL CENTER | Age: 29
End: 2019-10-25
Payer: COMMERCIAL

## 2019-10-25 ENCOUNTER — APPOINTMENT (OUTPATIENT)
Dept: OBGYN | Facility: MEDICAL CENTER | Age: 29
End: 2019-10-25
Attending: OBSTETRICS & GYNECOLOGY
Payer: COMMERCIAL

## 2019-10-25 ENCOUNTER — HOSPITAL ENCOUNTER (INPATIENT)
Facility: MEDICAL CENTER | Age: 29
LOS: 2 days | End: 2019-10-27
Attending: OBSTETRICS & GYNECOLOGY | Admitting: OBSTETRICS & GYNECOLOGY
Payer: COMMERCIAL

## 2019-10-25 ENCOUNTER — ANESTHESIA EVENT (OUTPATIENT)
Dept: ANESTHESIOLOGY | Facility: MEDICAL CENTER | Age: 29
End: 2019-10-25
Payer: COMMERCIAL

## 2019-10-25 LAB
BASOPHILS # BLD AUTO: 0.4 % (ref 0–1.8)
BASOPHILS # BLD: 0.04 K/UL (ref 0–0.12)
EOSINOPHIL # BLD AUTO: 0.1 K/UL (ref 0–0.51)
EOSINOPHIL NFR BLD: 0.9 % (ref 0–6.9)
ERYTHROCYTE [DISTWIDTH] IN BLOOD BY AUTOMATED COUNT: 44.7 FL (ref 35.9–50)
HCT VFR BLD AUTO: 35.9 % (ref 37–47)
HGB BLD-MCNC: 11.7 G/DL (ref 12–16)
HOLDING TUBE BB 8507: NORMAL
IMM GRANULOCYTES # BLD AUTO: 0.09 K/UL (ref 0–0.11)
IMM GRANULOCYTES NFR BLD AUTO: 0.8 % (ref 0–0.9)
LYMPHOCYTES # BLD AUTO: 2.57 K/UL (ref 1–4.8)
LYMPHOCYTES NFR BLD: 24.1 % (ref 22–41)
MCH RBC QN AUTO: 32.5 PG (ref 27–33)
MCHC RBC AUTO-ENTMCNC: 32.6 G/DL (ref 33.6–35)
MCV RBC AUTO: 99.7 FL (ref 81.4–97.8)
MONOCYTES # BLD AUTO: 0.87 K/UL (ref 0–0.85)
MONOCYTES NFR BLD AUTO: 8.2 % (ref 0–13.4)
NEUTROPHILS # BLD AUTO: 6.98 K/UL (ref 2–7.15)
NEUTROPHILS NFR BLD: 65.6 % (ref 44–72)
NRBC # BLD AUTO: 0 K/UL
NRBC BLD-RTO: 0 /100 WBC
PLATELET # BLD AUTO: 238 K/UL (ref 164–446)
PMV BLD AUTO: 9.6 FL (ref 9–12.9)
RBC # BLD AUTO: 3.6 M/UL (ref 4.2–5.4)
WBC # BLD AUTO: 10.7 K/UL (ref 4.8–10.8)

## 2019-10-25 PROCEDURE — 303615 HCHG EPIDURAL/SPINAL ANESTHESIA FOR LABOR

## 2019-10-25 PROCEDURE — 700111 HCHG RX REV CODE 636 W/ 250 OVERRIDE (IP): Performed by: OBSTETRICS & GYNECOLOGY

## 2019-10-25 PROCEDURE — A9270 NON-COVERED ITEM OR SERVICE: HCPCS | Performed by: OBSTETRICS & GYNECOLOGY

## 2019-10-25 PROCEDURE — 3E033VJ INTRODUCTION OF OTHER HORMONE INTO PERIPHERAL VEIN, PERCUTANEOUS APPROACH: ICD-10-PCS | Performed by: OBSTETRICS & GYNECOLOGY

## 2019-10-25 PROCEDURE — 770002 HCHG ROOM/CARE - OB PRIVATE (112)

## 2019-10-25 PROCEDURE — 700111 HCHG RX REV CODE 636 W/ 250 OVERRIDE (IP)

## 2019-10-25 PROCEDURE — 700105 HCHG RX REV CODE 258: Performed by: OBSTETRICS & GYNECOLOGY

## 2019-10-25 PROCEDURE — 700102 HCHG RX REV CODE 250 W/ 637 OVERRIDE(OP): Performed by: OBSTETRICS & GYNECOLOGY

## 2019-10-25 PROCEDURE — 59409 OBSTETRICAL CARE: CPT

## 2019-10-25 PROCEDURE — 302128 INFUSION PUMP

## 2019-10-25 PROCEDURE — 700101 HCHG RX REV CODE 250: Performed by: ANESTHESIOLOGY

## 2019-10-25 PROCEDURE — 85025 COMPLETE CBC W/AUTO DIFF WBC: CPT

## 2019-10-25 PROCEDURE — 36415 COLL VENOUS BLD VENIPUNCTURE: CPT

## 2019-10-25 PROCEDURE — 700111 HCHG RX REV CODE 636 W/ 250 OVERRIDE (IP): Performed by: ANESTHESIOLOGY

## 2019-10-25 PROCEDURE — 304965 HCHG RECOVERY SERVICES

## 2019-10-25 RX ORDER — LIDOCAINE HYDROCHLORIDE AND EPINEPHRINE 15; 5 MG/ML; UG/ML
INJECTION, SOLUTION EPIDURAL
Status: COMPLETED | OUTPATIENT
Start: 2019-10-25 | End: 2019-10-25

## 2019-10-25 RX ORDER — SODIUM CHLORIDE, SODIUM LACTATE, POTASSIUM CHLORIDE, CALCIUM CHLORIDE 600; 310; 30; 20 MG/100ML; MG/100ML; MG/100ML; MG/100ML
INJECTION, SOLUTION INTRAVENOUS CONTINUOUS
Status: DISPENSED | OUTPATIENT
Start: 2019-10-25 | End: 2019-10-25

## 2019-10-25 RX ORDER — MISOPROSTOL 200 UG/1
600 TABLET ORAL
Status: DISCONTINUED | OUTPATIENT
Start: 2019-10-25 | End: 2019-10-27 | Stop reason: HOSPADM

## 2019-10-25 RX ORDER — MISOPROSTOL 200 UG/1
800 TABLET ORAL
Status: DISCONTINUED | OUTPATIENT
Start: 2019-10-25 | End: 2019-10-25 | Stop reason: HOSPADM

## 2019-10-25 RX ORDER — CARBOPROST TROMETHAMINE 250 UG/ML
250 INJECTION, SOLUTION INTRAMUSCULAR
Status: DISCONTINUED | OUTPATIENT
Start: 2019-10-25 | End: 2019-10-27 | Stop reason: HOSPADM

## 2019-10-25 RX ORDER — ROPIVACAINE HYDROCHLORIDE 2 MG/ML
INJECTION, SOLUTION EPIDURAL; INFILTRATION; PERINEURAL
Status: COMPLETED
Start: 2019-10-25 | End: 2019-10-25

## 2019-10-25 RX ORDER — ACETAMINOPHEN 325 MG/1
325 TABLET ORAL EVERY 4 HOURS PRN
Status: DISCONTINUED | OUTPATIENT
Start: 2019-10-25 | End: 2019-10-27 | Stop reason: HOSPADM

## 2019-10-25 RX ORDER — ONDANSETRON 4 MG/1
4 TABLET, ORALLY DISINTEGRATING ORAL EVERY 6 HOURS PRN
Status: DISCONTINUED | OUTPATIENT
Start: 2019-10-25 | End: 2019-10-27 | Stop reason: HOSPADM

## 2019-10-25 RX ORDER — SODIUM CHLORIDE, SODIUM LACTATE, POTASSIUM CHLORIDE, CALCIUM CHLORIDE 600; 310; 30; 20 MG/100ML; MG/100ML; MG/100ML; MG/100ML
INJECTION, SOLUTION INTRAVENOUS CONTINUOUS
Status: DISCONTINUED | OUTPATIENT
Start: 2019-10-25 | End: 2019-10-27 | Stop reason: HOSPADM

## 2019-10-25 RX ORDER — IBUPROFEN 600 MG/1
600 TABLET ORAL EVERY 6 HOURS PRN
Status: DISCONTINUED | OUTPATIENT
Start: 2019-10-25 | End: 2019-10-27 | Stop reason: HOSPADM

## 2019-10-25 RX ORDER — FAMOTIDINE 20 MG/1
20 TABLET, FILM COATED ORAL 2 TIMES DAILY
Status: DISCONTINUED | OUTPATIENT
Start: 2019-10-25 | End: 2019-10-27 | Stop reason: HOSPADM

## 2019-10-25 RX ORDER — ROPIVACAINE HYDROCHLORIDE 2 MG/ML
INJECTION, SOLUTION EPIDURAL; INFILTRATION; PERINEURAL CONTINUOUS
Status: DISCONTINUED | OUTPATIENT
Start: 2019-10-25 | End: 2019-10-27 | Stop reason: HOSPADM

## 2019-10-25 RX ORDER — OXYCODONE AND ACETAMINOPHEN 10; 325 MG/1; MG/1
1 TABLET ORAL EVERY 4 HOURS PRN
Status: DISCONTINUED | OUTPATIENT
Start: 2019-10-25 | End: 2019-10-27 | Stop reason: HOSPADM

## 2019-10-25 RX ORDER — DOCUSATE SODIUM 100 MG/1
100 CAPSULE, LIQUID FILLED ORAL 2 TIMES DAILY PRN
Status: DISCONTINUED | OUTPATIENT
Start: 2019-10-25 | End: 2019-10-27 | Stop reason: HOSPADM

## 2019-10-25 RX ORDER — ONDANSETRON 2 MG/ML
4 INJECTION INTRAMUSCULAR; INTRAVENOUS EVERY 6 HOURS PRN
Status: DISCONTINUED | OUTPATIENT
Start: 2019-10-25 | End: 2019-10-27 | Stop reason: HOSPADM

## 2019-10-25 RX ORDER — SODIUM CHLORIDE, SODIUM LACTATE, POTASSIUM CHLORIDE, AND CALCIUM CHLORIDE .6; .31; .03; .02 G/100ML; G/100ML; G/100ML; G/100ML
1000 INJECTION, SOLUTION INTRAVENOUS
Status: DISCONTINUED | OUTPATIENT
Start: 2019-10-25 | End: 2019-10-25 | Stop reason: HOSPADM

## 2019-10-25 RX ORDER — HYDROXYZINE 50 MG/1
50 TABLET, FILM COATED ORAL EVERY 6 HOURS PRN
Status: DISCONTINUED | OUTPATIENT
Start: 2019-10-25 | End: 2019-10-25 | Stop reason: HOSPADM

## 2019-10-25 RX ORDER — METHYLERGONOVINE MALEATE 0.2 MG/ML
0.2 INJECTION INTRAVENOUS
Status: DISCONTINUED | OUTPATIENT
Start: 2019-10-25 | End: 2019-10-27 | Stop reason: HOSPADM

## 2019-10-25 RX ORDER — SODIUM CHLORIDE, SODIUM LACTATE, POTASSIUM CHLORIDE, CALCIUM CHLORIDE 600; 310; 30; 20 MG/100ML; MG/100ML; MG/100ML; MG/100ML
INJECTION, SOLUTION INTRAVENOUS PRN
Status: DISCONTINUED | OUTPATIENT
Start: 2019-10-25 | End: 2019-10-27 | Stop reason: HOSPADM

## 2019-10-25 RX ORDER — VITAMIN A ACETATE, BETA CAROTENE, ASCORBIC ACID, CHOLECALCIFEROL, .ALPHA.-TOCOPHEROL ACETATE, DL-, THIAMINE MONONITRATE, RIBOFLAVIN, NIACINAMIDE, PYRIDOXINE HYDROCHLORIDE, FOLIC ACID, CYANOCOBALAMIN, CALCIUM CARBONATE, FERROUS FUMARATE, ZINC OXIDE, CUPRIC OXIDE 3080; 12; 120; 400; 1; 1.84; 3; 20; 22; 920; 25; 200; 27; 10; 2 [IU]/1; UG/1; MG/1; [IU]/1; MG/1; MG/1; MG/1; MG/1; MG/1; [IU]/1; MG/1; MG/1; MG/1; MG/1; MG/1
1 TABLET, FILM COATED ORAL EVERY MORNING
Status: DISCONTINUED | OUTPATIENT
Start: 2019-10-26 | End: 2019-10-27 | Stop reason: HOSPADM

## 2019-10-25 RX ORDER — OXYCODONE HYDROCHLORIDE AND ACETAMINOPHEN 5; 325 MG/1; MG/1
1 TABLET ORAL EVERY 4 HOURS PRN
Status: DISCONTINUED | OUTPATIENT
Start: 2019-10-25 | End: 2019-10-27 | Stop reason: HOSPADM

## 2019-10-25 RX ORDER — SODIUM CHLORIDE, SODIUM LACTATE, POTASSIUM CHLORIDE, AND CALCIUM CHLORIDE .6; .31; .03; .02 G/100ML; G/100ML; G/100ML; G/100ML
250 INJECTION, SOLUTION INTRAVENOUS PRN
Status: DISCONTINUED | OUTPATIENT
Start: 2019-10-25 | End: 2019-10-25 | Stop reason: HOSPADM

## 2019-10-25 RX ORDER — DEXTROSE, SODIUM CHLORIDE, SODIUM LACTATE, POTASSIUM CHLORIDE, AND CALCIUM CHLORIDE 5; .6; .31; .03; .02 G/100ML; G/100ML; G/100ML; G/100ML; G/100ML
INJECTION, SOLUTION INTRAVENOUS CONTINUOUS
Status: DISCONTINUED | OUTPATIENT
Start: 2019-10-25 | End: 2019-10-27 | Stop reason: HOSPADM

## 2019-10-25 RX ORDER — BUPIVACAINE HYDROCHLORIDE 2.5 MG/ML
INJECTION, SOLUTION EPIDURAL; INFILTRATION; INTRACAUDAL
Status: ACTIVE
Start: 2019-10-25 | End: 2019-10-26

## 2019-10-25 RX ORDER — ALUMINA, MAGNESIA, AND SIMETHICONE 2400; 2400; 240 MG/30ML; MG/30ML; MG/30ML
30 SUSPENSION ORAL 4 TIMES DAILY PRN
Status: DISCONTINUED | OUTPATIENT
Start: 2019-10-25 | End: 2019-10-27 | Stop reason: HOSPADM

## 2019-10-25 RX ADMIN — FAMOTIDINE 20 MG: 20 TABLET ORAL at 10:24

## 2019-10-25 RX ADMIN — LIDOCAINE HYDROCHLORIDE,EPINEPHRINE BITARTRATE 3 ML: 15; .005 INJECTION, SOLUTION EPIDURAL; INFILTRATION; INTRACAUDAL; PERINEURAL at 14:21

## 2019-10-25 RX ADMIN — SODIUM CHLORIDE 2.5 MILLION UNITS: 9 INJECTION, SOLUTION INTRAVENOUS at 12:43

## 2019-10-25 RX ADMIN — ROPIVACAINE HYDROCHLORIDE: 2 INJECTION, SOLUTION EPIDURAL; INFILTRATION at 14:38

## 2019-10-25 RX ADMIN — OXYCODONE HYDROCHLORIDE AND ACETAMINOPHEN 1 TABLET: 5; 325 TABLET ORAL at 20:11

## 2019-10-25 RX ADMIN — SODIUM CHLORIDE 2.5 MILLION UNITS: 9 INJECTION, SOLUTION INTRAVENOUS at 17:06

## 2019-10-25 RX ADMIN — ALUMINUM HYDROXIDE, MAGNESIUM HYDROXIDE,SIMETHICONE 30 ML: 400; 400; 40 LIQUID ORAL at 10:24

## 2019-10-25 RX ADMIN — BUPIVACAINE HYDROCHLORIDE 10 ML: 2.5 INJECTION, SOLUTION EPIDURAL; INFILTRATION; INTRACAUDAL; PERINEURAL at 14:21

## 2019-10-25 RX ADMIN — SODIUM CHLORIDE 5 MILLION UNITS: 900 INJECTION INTRAVENOUS at 06:40

## 2019-10-25 RX ADMIN — SODIUM CHLORIDE, POTASSIUM CHLORIDE, SODIUM LACTATE AND CALCIUM CHLORIDE: 600; 310; 30; 20 INJECTION, SOLUTION INTRAVENOUS at 13:43

## 2019-10-25 RX ADMIN — SODIUM CHLORIDE, POTASSIUM CHLORIDE, SODIUM LACTATE AND CALCIUM CHLORIDE: 600; 310; 30; 20 INJECTION, SOLUTION INTRAVENOUS at 15:15

## 2019-10-25 RX ADMIN — SODIUM CHLORIDE, POTASSIUM CHLORIDE, SODIUM LACTATE AND CALCIUM CHLORIDE: 600; 310; 30; 20 INJECTION, SOLUTION INTRAVENOUS at 06:40

## 2019-10-25 RX ADMIN — IBUPROFEN 600 MG: 600 TABLET ORAL at 20:11

## 2019-10-25 RX ADMIN — OXYTOCIN 125 ML/HR: 10 INJECTION, SOLUTION INTRAMUSCULAR; INTRAVENOUS at 18:32

## 2019-10-25 RX ADMIN — ROPIVACAINE HYDROCHLORIDE: 2 INJECTION, SOLUTION EPIDURAL; INFILTRATION; PERINEURAL at 14:38

## 2019-10-25 RX ADMIN — Medication 2 MILLI-UNITS/MIN: at 06:58

## 2019-10-25 ASSESSMENT — COPD QUESTIONNAIRES
DURING THE PAST 4 WEEKS HOW MUCH DID YOU FEEL SHORT OF BREATH: NONE/LITTLE OF THE TIME
HAVE YOU SMOKED AT LEAST 100 CIGARETTES IN YOUR ENTIRE LIFE: NO/DON'T KNOW
IN THE PAST 12 MONTHS DO YOU DO LESS THAN YOU USED TO BECAUSE OF YOUR BREATHING PROBLEMS: DISAGREE/UNSURE
DO YOU EVER COUGH UP ANY MUCUS OR PHLEGM?: NO/ONLY WITH OCCASIONAL COLDS OR INFECTIONS
COPD SCREENING SCORE: 0

## 2019-10-25 ASSESSMENT — PATIENT HEALTH QUESTIONNAIRE - PHQ9
SUM OF ALL RESPONSES TO PHQ9 QUESTIONS 1 AND 2: 0
1. LITTLE INTEREST OR PLEASURE IN DOING THINGS: NOT AT ALL
2. FEELING DOWN, DEPRESSED, IRRITABLE, OR HOPELESS: NOT AT ALL

## 2019-10-25 ASSESSMENT — LIFESTYLE VARIABLES
EVER_SMOKED: NEVER
ALCOHOL_USE: NO

## 2019-10-25 NOTE — PROGRESS NOTES
Pt is a , EDC 10/28, 39.4 wks arriving ambulatory with AKBAR Davis to scheduled elective IOL. Pt denies complications with this pregnancy, states having history of PPH x2, no transfusion needed however needed medications both deliveries. States +FM, -VB, -LOF. SVE by RN, see flowsheets.  0700- Report given to FLORI Short RN, POC discussed. Pt's questions answered.

## 2019-10-25 NOTE — H&P
DATE OF ADMISSION:  10/25/2019    HISTORY OF PRESENT ILLNESS:  This is a 29-year-old  3, para 2 at 39   weeks who presents for elective induction of labor.  She reports occasional   contractions.  She denies loss of fluid, vaginal bleeding, and reports good   fetal movement.  Pregnancy has been uncomplicated.    Her XIN is 10/28/2019.  She is blood type O positive, AST negative, rubella   immune, hep B surface antigen negative, HIV negative, gonorrhea and chlamydia   negative, RPR nonreactive, and GBS positive, quad screen is negative.    Ultrasound showed a normal fetal anatomical survey.    PAST MEDICAL HISTORY:  Depression with no current medications.    PAST SURGICAL HISTORY:  [place] teeth removal.    MEDICATIONS:  Prenatal vitamins.    ALLERGIES:  NKDA.    GYNECOLOGIC HISTORY:  No history of sexually transmitted disease or HSV.    OBSTETRICAL HISTORY:  Patient is a  3, para 2.  She has had 2   uncomplicated vaginal deliveries of babies weighing 6-1/2 to 7-1/2 pounds    Sterile vaginal exam completed on 10/21/2019 showed that she is 1, 50 and -3.    Fetal heart tones present.    ASSESSMENT AND PLAN:  1. This is a 29-year-old  3, para 2 at 39 weeks, here for elective   induction of labor.  Options have been reviewed and the patient has opted for   induction.  We have discussed Pitocin and amniotomy.  2. Plan continuous fetal surveillance.  3. May have epidural on demand.  4. Group B Streptococcus positive.  Plan, treatment with penicillin.             ____________________________________     MD LEXI ADAMS / JOSE    DD:  10/24/2019 16:36:14  DT:  10/24/2019 16:45:23    D#:  [id number]  Job#:  [id number]

## 2019-10-25 NOTE — PROGRESS NOTES
0700) Report received from GUY Cullen.  Pt comfortable at this time.  POC disucssed, pt educated on pain intervention options.  Pt desires epidural and will notify RN when feeling pain.    0750) Dr Roman at bedside to discuss POC  0805) Report to CHELY Anders RN

## 2019-10-25 NOTE — PROGRESS NOTES
Progress note    October 25, 2019  1215    Patient was admitted for elective induction.  She is currently on Pitocin    Subjective:  Patient is comfortable with contractions    Objective:  Vitals are normal  Cervix is 3 cm, 60% effaced, vertex, -2, amniotic sac was ruptured  Fetal heart tones are category 1    Assessment:  Term pregnancy at 39 weeks  2-induction of labor  3-group B strep positive    Plan:  Expectant management, patient is receiving penicillin for group B strep

## 2019-10-25 NOTE — PROGRESS NOTES
1100: Charge nurse called, asked if I could watch pt's strip for Ania Anders RN. Care assumed  1158: Dr Roman at bedside, SVE: 3/60/-2 AROM: clear   1423: Dr willis at bedside for epidural  1455: babb placed  1500: Dr Roman called and updated orders to check pt   1501: SVE: 3-4/70/0, Dr Roman called, will continue with pitocin at this time   1640: Dr Roman at bedside: SVE: 6cm   1729: SVE: complete  1730: Dr Roman called  1733: PT feeling like she is going to pass out, BP taken 90/50, bolus of fluid started   1738: Dr Roman at bedside   1741: pt feeling better, began pushing  1747: delivery of viable baby boy   1750: delivery of placenta     Recovery: fundus: firm/light/ at U     2030: pt up to bathroom, voided, marbin care performed, gown changed, epidural removed, pt transferred to postpartum via wheelchair in stable condition. Report given to Yudy CAI. Bands checked

## 2019-10-25 NOTE — ANESTHESIA PREPROCEDURE EVALUATION
Relevant Problems   ANESTHESIA (within normal limits)      PULMONARY (within normal limits)      NEURO (within normal limits)      CARDIAC (within normal limits)      GI (within normal limits)       (within normal limits)      ENDO (within normal limits)      OB (within normal limits)       Physical Exam    Airway   Mallampati: II  TM distance: >3 FB  Neck ROM: full       Cardiovascular - normal exam  Rhythm: regular  Rate: normal  (-) murmur     Dental - normal exam         Pulmonary - normal exam  Breath sounds clear to auscultation     Abdominal    Neurological - normal exam                 Anesthesia Plan    ASA 2       Plan - epidural   Neuraxial block will be labor analgesia              Pertinent diagnostic labs and testing reviewed    Informed Consent:    Anesthetic plan and risks discussed with patient.

## 2019-10-25 NOTE — ANESTHESIA PROCEDURE NOTES
Epidural Block  Date/Time: 10/25/2019 2:21 PM  Performed by: Dax Gong D.O.  Authorized by: Dax Gong D.O.     Patient Location:  OB  Start Time:  10/25/2019 2:21 PM  End Time:  10/25/2019 2:33 PM  Reason for Block: labor analgesia    patient identified, IV checked, site marked, risks and benefits discussed, surgical consent, monitors and equipment checked, pre-op evaluation and timeout performed    Patient Position:  Sitting  Prep: ChloraPrep, patient draped and sterile technique    Monitoring:  Blood pressure, continuous pulse oximetry and heart rate  Approach:  Midline  Location:  L3-L4  Injection Technique:  HERMAN saline  Skin infiltration:  Lidocaine  Strength:  1%  Dose:  3ml  Needle Type:  Tuohy  Needle Gauge:  17 G  Needle Length:  3.5 in  Loss of resistance::  6  Catheter Size:  19 G  Catheter at Skin Depth:  14  Test Dose:  Lidocaine 1.5% with epinephrine 1-to-200,000

## 2019-10-26 LAB
ERYTHROCYTE [DISTWIDTH] IN BLOOD BY AUTOMATED COUNT: 44.4 FL (ref 35.9–50)
HCT VFR BLD AUTO: 31.4 % (ref 37–47)
HGB BLD-MCNC: 10.1 G/DL (ref 12–16)
MCH RBC QN AUTO: 32.3 PG (ref 27–33)
MCHC RBC AUTO-ENTMCNC: 32.2 G/DL (ref 33.6–35)
MCV RBC AUTO: 100.3 FL (ref 81.4–97.8)
PLATELET # BLD AUTO: 198 K/UL (ref 164–446)
PMV BLD AUTO: 9.5 FL (ref 9–12.9)
RBC # BLD AUTO: 3.13 M/UL (ref 4.2–5.4)
WBC # BLD AUTO: 12.9 K/UL (ref 4.8–10.8)

## 2019-10-26 PROCEDURE — 36415 COLL VENOUS BLD VENIPUNCTURE: CPT

## 2019-10-26 PROCEDURE — 700102 HCHG RX REV CODE 250 W/ 637 OVERRIDE(OP): Performed by: OBSTETRICS & GYNECOLOGY

## 2019-10-26 PROCEDURE — 770002 HCHG ROOM/CARE - OB PRIVATE (112)

## 2019-10-26 PROCEDURE — A9270 NON-COVERED ITEM OR SERVICE: HCPCS | Performed by: OBSTETRICS & GYNECOLOGY

## 2019-10-26 PROCEDURE — 85027 COMPLETE CBC AUTOMATED: CPT

## 2019-10-26 PROCEDURE — 700112 HCHG RX REV CODE 229: Performed by: OBSTETRICS & GYNECOLOGY

## 2019-10-26 RX ADMIN — IBUPROFEN 600 MG: 600 TABLET ORAL at 02:17

## 2019-10-26 RX ADMIN — ACETAMINOPHEN 325 MG: 325 TABLET, FILM COATED ORAL at 01:21

## 2019-10-26 RX ADMIN — VITAMIN A, VITAMIN C, VITAMIN D-3, VITAMIN E, VITAMIN B-1, VITAMIN B-2, NIACIN, VITAMIN B-6, CALCIUM, IRON, ZINC, COPPER 1 TABLET: 4000; 120; 400; 22; 1.84; 3; 20; 10; 1; 12; 200; 27; 25; 2 TABLET ORAL at 07:00

## 2019-10-26 RX ADMIN — DOCUSATE SODIUM 100 MG: 100 CAPSULE, LIQUID FILLED ORAL at 10:34

## 2019-10-26 RX ADMIN — IBUPROFEN 600 MG: 600 TABLET ORAL at 10:33

## 2019-10-26 RX ADMIN — IBUPROFEN 600 MG: 600 TABLET ORAL at 17:45

## 2019-10-26 ASSESSMENT — EDINBURGH POSTNATAL DEPRESSION SCALE (EPDS)
I HAVE BEEN ANXIOUS OR WORRIED FOR NO GOOD REASON: HARDLY EVER
I HAVE FELT SCARED OR PANICKY FOR NO GOOD REASON: NO, NOT MUCH
THE THOUGHT OF HARMING MYSELF HAS OCCURRED TO ME: NEVER
I HAVE FELT SAD OR MISERABLE: NOT VERY OFTEN
I HAVE BEEN SO UNHAPPY THAT I HAVE HAD DIFFICULTY SLEEPING: NOT AT ALL
I HAVE BEEN SO UNHAPPY THAT I HAVE BEEN CRYING: ONLY OCCASIONALLY
THINGS HAVE BEEN GETTING ON TOP OF ME: NO, MOST OF THE TIME I HAVE COPED QUITE WELL
I HAVE LOOKED FORWARD WITH ENJOYMENT TO THINGS: AS MUCH AS I EVER DID
I HAVE BLAMED MYSELF UNNECESSARILY WHEN THINGS WENT WRONG: YES, SOME OF THE TIME
I HAVE BEEN ABLE TO LAUGH AND SEE THE FUNNY SIDE OF THINGS: AS MUCH AS I ALWAYS COULD

## 2019-10-26 ASSESSMENT — PAIN SCALES - GENERAL: PAIN_LEVEL: 0

## 2019-10-26 NOTE — DISCHARGE INSTRUCTIONS
POSTPARTUM DISCHARGE INSTRUCTIONS FOR MOM    YOB: 1990   Age: 29 y.o.               Admit Date: 10/25/2019     Discharge Date: 10/26/2019  Attending Doctor:  Gena Mas M.D.                  Allergies:  Patient has no known allergies.    Discharged to home by car. Discharged via wheelchair, hospital escort: Yes.  Special equipment needed: Not Applicable  Belongings with: Personal  Be sure to schedule a follow-up appointment with your primary care doctor or any specialists as instructed.     Discharge Plan:   Diet Plan: Discussed  Activity Level: Discussed  Confirmed Follow up Appointment: Patient to Call and Schedule Appointment  Confirmed Symptoms Management: Discussed  Medication Reconciliation Updated: Yes  Influenza Vaccine Indication: Not indicated: Previously immunized this influenza season and > 8 years of age    REASONS TO CALL YOUR OBSTETRICIAN:  1.   Persistent fever or shaking chills (Temperature higher than 100.4)  2.   Heavy bleeding (soaking more than 1 pad per hour); Passing clots  3.   Foul odor from vagina  4.   Mastitis (Breast infection; breast pain, chills, fever, redness)  5.   Urinary pain, burning or frequency  6.   Episiotomy infection  7.   Abdominal incision infection  8.   Severe depression longer than 24 hours    HAND WASHING  · Prior to handling the baby.  · Before breastfeeding or bottle feeding baby.  · After using the bathroom or changing the baby's diaper.    WOUND CARE  Ask your physician for additional care instructions.  In general:    ·  Incision:      · Keep clean and dry.    · Do NOT lift anything heavier than your baby for up to 6 weeks.    · There should not be any opening or pus.      VAGINAL CARE  · Nothing inside vagina for 6 weeks: no sexual intercourse, tampons or douching.  · Bleeding may continue for 2-4 weeks.  Amount may vary.    · Call your physician for heavy bleeding which means soaking more than 1 pad per hour    BIRTH CONTROL  · It is  "possible to become pregnant at any time after delivery and while breastfeeding.  · Plan to discuss a method of birth control with your physician at your follow up visit. visit.    DIET AND ELIMINATION  · Eating more fiber (bran cereal, fruits, and vegetables) and drinking plenty of fluids will help to avoid constipation.  · Urinary frequency after childbirth is normal.    POSTPARTUM BLUES  During the first few days after birth, you may experience a sense of the \"blues\" which may include impatience, irritability or even crying.  These feeling come and go quickly.  However, as many as 1 in 10 women experience emotional symptoms known as postpartum depression.    Postpartum depression:  May start as early as the second or third day after delivery or take several weeks or months to develop.  Symptoms of \"blues\" are present, but are more intense:  Crying spells; loss of appetite; feelings of hopelessness or loss of control; fear of touching the baby; over concern or no concern at all about the baby; little or no concern about your own appearance/caring for yourself; and/or inability to sleep or excessive sleeping.  Contact your physician if you are experiencing any of these symptoms.    Crisis Hotline:  · [place] Crisis Hotline:  1-915-GXMCYDD  Or 1-903.721.6639  · [place] Crisis Hotline:  1-816.284.2873  Or 210-423-0773    PREVENTING SHAKEN BABY:  If you are angry or stressed, PUT THE BABY IN THE CRIB, step away, take some deep breaths, and wait until you are calm to care for the baby.  DO NOT SHAKE THE BABY.  You are not alone, call a supporter for help.    · Crisis Call Center 24/7 crisis line 310-664-3732 or 1-890.576.9597  · You can also text them, text \"ANSWER\" to 870785    QUIT SMOKING/TOBACCO USE:  I understand the use of any tobacco products increases my chance of suffering from future heart disease and could cause other illnesses which may shorten my life. Quitting the use of tobacco products is the single most " important thing I can do to improve my health. For further information on smoking / tobacco cessation call a Toll Free Quit Line at 1-659.954.8606 (*[employer] [place]) or 1-634.378.5879 (American Lung Association) or you can access the web based program at www.lungusa.org.    · [place] Tobacco Users Help Line:  (802) 323-2368       Toll Free: 1-559.145.7201  · Quit Tobacco Program [facility] (681)307-4665    DEPRESSION / SUICIDE RISK:  As you are discharged from this [facility], it is important to learn how to keep safe from harming yourself.    Recognize the warning signs:  · Abrupt changes in personality, positive or negative- including increase in energy   · Giving away possessions  · Change in eating patterns- significant weight changes-  positive or negative  · Change in sleeping patterns- unable to sleep or sleeping all the time   · Unwillingness or inability to communicate  · Depression  · Unusual sadness, discouragement and loneliness  · Talk of wanting to die  · Neglect of personal appearance   · Rebelliousness- reckless behavior  · Withdrawal from people/activities they love  · Confusion- inability to concentrate     If you or a loved one observes any of these behaviors or has concerns about self-harm, here's what you can do:  · Talk about it- your feelings and reasons for harming yourself  · Remove any means that you might use to hurt yourself (examples: pills, rope, extension cords, firearm)  · Get professional help from the community (Mental Health, Substance Abuse, psychological counseling)  · Do not be alone:Call your Safe Contact- someone whom you trust who will be there for you.  · Call your local CRISIS HOTLINE 357-8163 or 731-460-3504  · Call your local Children's Mobile Crisis Response Team [place] (684) 164-2481 or www.[employer]  · Call the toll free National Suicide Prevention Hotlines   · National Suicide Prevention Lifeline 926-659-PEDP  (8373)  · [facility] 800-SUICIDE (950-8260)    DISCHARGE SURVEY:  Thank you for choosing [facility].  We hope we provided you with very good care.  You may be receiving a survey in the mail.  Please fill it out.  Your opinion is valuable to us.    ADDITIONAL EDUCATIONAL MATERIALS GIVEN TO PATIENT:        My signature on this form indicates that:  1.  I have reviewed and understand the above information  2.  My questions regarding this information have been answered to my satisfaction.  3.  I have formulated a plan with my discharge nurse to obtain my prescribed medication for home.

## 2019-10-26 NOTE — PROGRESS NOTES
Late entry: Care assumed at 0805. Pt comfortable, on 4 mu/min of Pitocin, desires epidural eventually. Physical assessment completed. FOB supportive at bedside. Care assumed by TRELL Barreto at 1100.

## 2019-10-26 NOTE — PROGRESS NOTES
2035: Patient arrived from L&D via wheelchair with infant in arms. Bedside report received from L&D RN. FOB at bedside. Bands verified with second RN, transponder # verified with lights flashing.   2040: Patient assessed at this time. Second bag of Pitocin infusing @ 125ml/hr. Patient states she is voiding without difficulty. Oriented to room, call light, whiteboard. Educated pt on feeding q2-3hrs, recording I/O's, and safe sleeping. Plan of care discussed, questions answered, call light within reach.

## 2019-10-26 NOTE — ANESTHESIA QCDR
2019 [facility] Clinical Data Registry (for Quality Improvement)     Postoperative nausea/vomiting risk protocol (Adult = 18 yrs and Pediatric 3-17 yrs)- (430 and 463)  General inhalation anesthetic (NOT TIVA) with PONV risk factors: No  Provision of anti-emetic therapy with at least 2 different classes of agents: N/A  Patient DID NOT receive anti-emetic therapy and reason is documented in Medical Record: N/A    Multimodal Pain Management- (AQI59)  Patient undergoing Elective Surgery (i.e. Outpatient, or ASC, or Prescheduled Surgery prior to Hospital Admission): No  Use of Multimodal Pain Management, two or more drugs and/or interventions, NOT including systemic opioids: N/A  Exception: Documented allergy to multiple classes of analgesics: N/A    PACU assessment of acute postoperative pain prior to Anesthesia Care End- Applies to Patients Age = 18- (ABG7)  Initial PACU pain score is which of the following: < 7/10  Patient unable to report pain score: N/A    Post-anesthetic transfer of care checklist/protocol to PACU/ICU- (426 and 427)  Upon conclusion of case, patient transferred to which of the following locations: PACU/Non-ICU  Use of transfer checklist/protocol: Yes  Exclusion: Service Performed in Patient Hospital Room (and thus did not require transfer): N/A    PACU Reintubation- (AQI31)  General anesthesia requiring endotracheal intubation (ETT) along with subsequent extubation in OR or PACU: No  Required reintubation in the PACU: N/A  Extubation was a planned trial documented in the medical record prior to removal of the original airway device: N/A    Unplanned admission to ICU related to anesthesia service up through end of PACU care- (MD51)  Unplanned admission to ICU (not initially anticipated at anesthesia start time): No

## 2019-10-26 NOTE — L&D DELIVERY NOTE
Delivery note    2019    Delivering physician: Keshia Roman MD  Attending physician: Jo barillas MD    Diagnoses:   1-Term intrauterine pregnancy  2-delivery of viable male Apgars 8/9      Procedure:  1-induction with vaginal delivery    Hospital course:    Patient 29-year-old female  3 para 2 presented at 39 weeks for elective induction.  She was begun on Pitocin.  When she was in active labor pattern we ruptured her amniotic sac.  She received an epidural.  Patient progressed normally to complete.  She delivered over 2 contractions.        Baby delivered an uncomplicated manner.  Baby was vigorous moving all extremities.  Cord was clamped and cut.  Baby was handed off to mom with nurse present.  Apgars were 8/9.  Placenta delivered spontaneously and intact.  Exam of uterus revealed no retained placenta.    Estimated blood loss was 200 cc.    There were no lacerations.

## 2019-10-26 NOTE — ANESTHESIA POSTPROCEDURE EVALUATION
Patient: Amy Ireland    Procedure Summary     Date:  10/25/19 Room / Location:      Anesthesia Start:  1421 Anesthesia Stop:  1747    Procedure:  Labor Epidural Diagnosis:      Scheduled Providers:   Responsible Provider:  Dax Gong D.O.    Anesthesia Type:  epidural ASA Status:  2          Final Anesthesia Type: epidural  Last vitals  BP   Blood Pressure: 107/62    Temp   36.4 °C (97.6 °F)    Pulse   Pulse: 89   Resp   19    SpO2   95 %      Anesthesia Post Evaluation    Patient location during evaluation: PACU  Patient participation: complete - patient participated  Level of consciousness: awake and alert  Pain score: 0    Airway patency: patent  Anesthetic complications: no  Cardiovascular status: hemodynamically stable  Respiratory status: acceptable  Hydration status: euvolemic    PONV: none           Nurse Pain Score: 7 (NPRS)

## 2019-10-26 NOTE — CARE PLAN
Problem: Communication  Goal: The ability to communicate needs accurately and effectively will improve  Outcome: PROGRESSING AS EXPECTED  Note:   Patient able to communicate wants and needs and states understanding of call light use.      Problem: Potential knowledge deficit related to lack of understanding of self and  care  Goal: Patient will verbalize understanding of self and infant care  Outcome: PROGRESSING AS EXPECTED  Note:   Patient and infant POC discussed with patient and family, verbalized understanding. Will continue to monitor and provide care.

## 2019-10-26 NOTE — PROGRESS NOTES
Progress Note        Subjective: Patient is doing well lochia is normal.    Objective: Vital signs are normal  General: Patient's awake alert pleasant  Head: Atraumatic normocephalic  Abdomen: Fundus is firm nontender    Assessment:  Postpartum day 1 vaginal delivery    Plan:  Patient will be discharged home  She is to follow-up in the office in 4-6 weeks  Infectious and bleeding precautions reviewed  Patient to take prenatal vitamin while breast-feeding    Keshia Roman MD

## 2019-10-26 NOTE — ANESTHESIA TIME REPORT
Anesthesia Start and Stop Event Times     Date Time Event    10/25/2019 1416 Ready for Procedure     1421 Anesthesia Start     1747 Anesthesia Stop        Responsible Staff  10/25/19    Name Role Begin End    Dax Gong D.O. Anesth 1421 1747        Preop Diagnosis (Free Text):  Pre-op Diagnosis             Preop Diagnosis (Codes):    Post op Diagnosis  Pain during labor  Pregnancy    Premium Reason  A. 3PM - 7AM    Comments:

## 2019-10-26 NOTE — LACTATION NOTE
This note was copied from a baby's chart.  Baby 39.4 weeks, . Baby currently in NB nursery getting monitored. Mother reports baby has been breastfeeding well, latch not seen. Mother states she  previous babies without problems. Contact sheet on OP lactation support given & invited to breastfeeding [place].     Teaching on hunger cues, breastfeeding when baby shows cues or by 3 hours from last feed, importance of skin to skin & cluster feeding. Encouraged mother to call for any lactation needs.     Breastfeeding POC:  Breastfeed when baby shows hunger cues or by 3 hours from last feed. F/U with OP lactation for support, contact resource sheet given.

## 2019-10-27 VITALS
HEIGHT: 61 IN | DIASTOLIC BLOOD PRESSURE: 81 MMHG | BODY MASS INDEX: 28.32 KG/M2 | OXYGEN SATURATION: 92 % | TEMPERATURE: 97.5 F | RESPIRATION RATE: 16 BRPM | SYSTOLIC BLOOD PRESSURE: 124 MMHG | WEIGHT: 150 LBS | HEART RATE: 76 BPM

## 2019-10-27 PROCEDURE — A9270 NON-COVERED ITEM OR SERVICE: HCPCS | Performed by: OBSTETRICS & GYNECOLOGY

## 2019-10-27 PROCEDURE — 700112 HCHG RX REV CODE 229: Performed by: OBSTETRICS & GYNECOLOGY

## 2019-10-27 PROCEDURE — 3E0234Z INTRODUCTION OF SERUM, TOXOID AND VACCINE INTO MUSCLE, PERCUTANEOUS APPROACH: ICD-10-PCS | Performed by: OBSTETRICS & GYNECOLOGY

## 2019-10-27 PROCEDURE — 700102 HCHG RX REV CODE 250 W/ 637 OVERRIDE(OP): Performed by: OBSTETRICS & GYNECOLOGY

## 2019-10-27 RX ORDER — IBUPROFEN 600 MG/1
600 TABLET ORAL EVERY 6 HOURS PRN
Qty: 30 TAB | Refills: 1 | Status: SHIPPED | OUTPATIENT
Start: 2019-10-27 | End: 2021-06-06

## 2019-10-27 RX ADMIN — DOCUSATE SODIUM 100 MG: 100 CAPSULE, LIQUID FILLED ORAL at 00:01

## 2019-10-27 RX ADMIN — IBUPROFEN 600 MG: 600 TABLET ORAL at 00:01

## 2019-10-27 RX ADMIN — VITAMIN A, VITAMIN C, VITAMIN D-3, VITAMIN E, VITAMIN B-1, VITAMIN B-2, NIACIN, VITAMIN B-6, CALCIUM, IRON, ZINC, COPPER 1 TABLET: 4000; 120; 400; 22; 1.84; 3; 20; 10; 1; 12; 200; 27; 25; 2 TABLET ORAL at 06:12

## 2019-10-27 RX ADMIN — IBUPROFEN 600 MG: 600 TABLET ORAL at 06:12

## 2019-10-27 NOTE — PROGRESS NOTES
Hospital Day : 2    S: doing well; desired dc    O:  Vitals:    10/26/19 1000 10/26/19 1400 10/26/19 1700 10/27/19 0600   BP: 111/70 117/69 122/74 124/81   Pulse: 95 93 78 76   Resp: 18 18 18 16   Temp: 36.1 °C (97 °F) 36.4 °C (97.6 °F) 36.9 °C (98.5 °F) 36.4 °C (97.5 °F)   TempSrc: Temporal Temporal Temporal Temporal   SpO2: 95% 94% 99% 92%   Weight:       Height:           Recent Labs     10/25/19  0557 10/26/19  0433   WBC 10.7 12.9*   RBC 3.60* 3.13*   HEMOGLOBIN 11.7* 10.1*   HEMATOCRIT 35.9* 31.4*   MCV 99.7* 100.3*   MCH 32.5 32.3   MCHC 32.6* 32.2*   RDW 44.7 44.4   PLATELETCT 238 198   MPV 9.6 9.5             abd soft ff    A: pp2 sp abx; sp  doing well    P: dc

## 2019-10-27 NOTE — PROGRESS NOTES
Assessment complete. Patient will call when pain medication is requested. Discussed plan of care for the night. Reviewed feeding frequency. Call light within reach. Encouraged patient to call with any questions or concerns.

## 2019-10-27 NOTE — PROGRESS NOTES
Post partum teaching complete. Patient discharged home . Patient claims all questions have been answered. Denies problems. Follow up instructions given.

## 2019-10-27 NOTE — CARE PLAN
Problem: Altered physiologic condition related to immediate post-delivery state and potential for bleeding/hemorrhage  Goal: Patient physiologically stable as evidenced by normal lochia, palpable uterine involution and vital signs within normal limits  10/26/2019 2335 by Jailyn Atkins R.N.  Outcome: PROGRESSING AS EXPECTED  Note:   Fundus is firm, lochia scant, VSS  10/26/2019 2335 by Jailyn Atkins R.N.  Reactivated     Problem: Potential for postpartum infection related to presence of episiotomy/vaginal tear and/or uterine contamination  Goal: Patient will be absent from signs and symptoms of infection  10/26/2019 2335 by Jailyn Atkins R.N.  Outcome: PROGRESSING AS EXPECTED  Note:   No signs or symptoms of infection, will continue to monitor  10/26/2019 2335 by Jailyn Atkins R.N.  Reactivated

## 2019-10-27 NOTE — DISCHARGE SUMMARY
DATE OF ADMISSION:  10/25/2019    DATE OF DISCHARGE:  10/27/2019    ADMITTING DIAGNOSES:  1.  Pregnancy at 39 weeks, for elective induction of labor.  2.  Beta streptococcus positive.    DISCHARGE DIAGNOSES:  1.  Pregnancy at 39 weeks, for elective induction of labor.  2.  Beta streptococcus positive.  3.  Status post intravenous antibiotics, status post normal spontaneous   vaginal delivery.    HOSPITAL COURSE IN DETAIL:  This patient was admitted on the aforementioned   date for induction of labor.  She was started on IV penicillin as well as   Pitocin.  After antibiotics, she was AROM clear, progressed over normal labor   curve and eventually delivered an infant with Apgars of 8 and 9.  On   postpartum day #1, she is doing well without complaint, tolerating regular   diet with minimal lochia.  Today, postpartum day #2, she desires discharge   home.    PHYSICAL EXAMINATION:  She is afebrile.  Her vital signs are within normal   limits.  Her abdomen is soft with a full fundus below the umbilicus.    ASSESSMENT:  At this time is postpartum day #2, status post intravenous   antibiotics, status post normal spontaneous vaginal delivery, doing well,   desires discharge home.    PLAN:  At this time:  1.  Discharge to home.  2.  Followup in 6 weeks.  3.  Pelvic rest.  4.  Lift precautions.  5.  Scripts for Motrin written.       ____________________________________     MD TEDDY Worley / JOSE    DD:  10/27/2019 07:10:48  DT:  10/27/2019 07:22:52    D#:  [id number]  Job#:  [id number]

## 2019-10-27 NOTE — PROGRESS NOTES
1850- Dr. Grossman notified that patient desires to stay due to her infant not being not discharged.  Dr. Grossman notified that patient wants to move to a room closer to the NBN and will transferred into S301.  Telephone order received to cancel discharge.  1940- Report given to TRELL Glaser.

## 2019-11-19 ENCOUNTER — OFFICE VISIT (OUTPATIENT)
Dept: URGENT CARE | Facility: PHYSICIAN GROUP | Age: 29
End: 2019-11-19
Payer: COMMERCIAL

## 2019-11-19 VITALS
HEIGHT: 61 IN | BODY MASS INDEX: 26.62 KG/M2 | RESPIRATION RATE: 16 BRPM | SYSTOLIC BLOOD PRESSURE: 110 MMHG | WEIGHT: 141 LBS | DIASTOLIC BLOOD PRESSURE: 72 MMHG | OXYGEN SATURATION: 96 % | TEMPERATURE: 101 F | HEART RATE: 116 BPM

## 2019-11-19 DIAGNOSIS — J10.1 INFLUENZA B: ICD-10-CM

## 2019-11-19 LAB
FLUAV+FLUBV AG SPEC QL IA: POSITIVE
INT CON NEG: NEGATIVE
INT CON NEG: NEGATIVE
INT CON POS: POSITIVE
INT CON POS: POSITIVE
S PYO AG THROAT QL: NEGATIVE

## 2019-11-19 PROCEDURE — 87804 INFLUENZA ASSAY W/OPTIC: CPT | Performed by: PHYSICIAN ASSISTANT

## 2019-11-19 PROCEDURE — 87880 STREP A ASSAY W/OPTIC: CPT | Performed by: PHYSICIAN ASSISTANT

## 2019-11-19 PROCEDURE — 99214 OFFICE O/P EST MOD 30 MIN: CPT | Performed by: PHYSICIAN ASSISTANT

## 2019-11-19 RX ORDER — OSELTAMIVIR PHOSPHATE 75 MG/1
75 CAPSULE ORAL 2 TIMES DAILY
Qty: 10 CAP | Refills: 0 | Status: SHIPPED | OUTPATIENT
Start: 2019-11-19 | End: 2021-06-06

## 2019-11-19 RX ORDER — ALBUTEROL SULFATE 90 UG/1
2 AEROSOL, METERED RESPIRATORY (INHALATION) EVERY 6 HOURS PRN
Qty: 8.5 G | Refills: 0 | Status: SHIPPED | OUTPATIENT
Start: 2019-11-19 | End: 2021-06-06

## 2019-11-19 ASSESSMENT — ENCOUNTER SYMPTOMS
SWOLLEN GLANDS: 1
CHILLS: 1
FEVER: 1
SHORTNESS OF BREATH: 0
TROUBLE SWALLOWING: 1
DIZZINESS: 0
DIARRHEA: 0
WHEEZING: 0
VOMITING: 0
NAUSEA: 0
MYALGIAS: 1
SORE THROAT: 1
HEMOPTYSIS: 0
HEADACHES: 1
COUGH: 1
ABDOMINAL PAIN: 0
SPUTUM PRODUCTION: 0
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
## 2020-02-01 NOTE — PROGRESS NOTES
Initial Pulmonary / Critical Care Consultation Assessment / Plan:   
Acute asthmatic bronchitis who failed outpatient therapy - better with IV abx and steroids. Also on nebs 
 
afib with RVR 
 
--not wheezing on exam today - will stop scheduled duonebs with accelerated heart rate 
--continue solumedrol, levaquin, pulmicort and brovana 
--on bumex po daily --cardiology to see History / Subjective: 
Reason:  Asthmatic bronchitis Requesting Provider:  Dr Jhonatan Martin Nelda Gomez is a 82 y.o.  female who  has a past medical history of Atrial fibrillation ([facility] [address] CAD (coronary artery disease), Cerebrovascular accident stroke, other, unspec (11/18/2010), Coronary atherosclerosis of native coronary artery (11/18/2010), Essential hypertension, Essential hypertension, benign (11/18/2010), HLD (hyperlipidemia) (11/18/2010), PVD (peripheral vascular disease) ([facility] [address] (2017), Type II or unspecified type diabetes mellitus without mention of complication, not stated as uncontrolled (11/18/2010), and Zoster. admitted 1/31/2020 with cough and shortness of breath Former smoker 1 month of cough and wheezing treated with 2 courses of abx and steroids as outpatient without resolution and has been admitted No asdz on cxr Given nebs, iv steroids and started on levaquin and is feeling better today Was on NC O2 and now weaning to room air Has h/o afib and HR in 130's  
Cardiology has been consulted Allergies Allergen Reactions  Bactrim [Sulfamethoprim] Other (comments) Affects her kidney funtion  Clonidine Shortness of Breath and Swelling Lightheaded,   
 Codeine Unknown (comments)  Keflin Itching  Pcn [Penicillins] Unknown (comments)  Tramadol Other (comments) Makes her head feel swishy Past Medical History:  
Diagnosis Date  Atrial fibrillation ([facility] [address]  CAD (coronary artery disease)  Cerebrovascular accident stroke, other, unspec 11/18/2010 PPD # 2 S/P     Doing well.  Voiding, ambulating, breast feeding.  Denies heavy bleeding.    AF, VS wnl  AAO, NAD  Abd: S/NT/ND, fundus firm  Calves: NT, no edema    Hct 33.7  Rh positive  Rubella immune    Plan:  Requests discharge home.  Home today.  Instructions reviewed.  F/u 6 weeks.  Rx for motrin and percocet.  Pelvic rest 6 weeks.    Coronary atherosclerosis of native coronary artery 11/18/2010  Essential hypertension  Essential hypertension, benign 11/18/2010  HLD (hyperlipidemia) 11/18/2010  PVD (peripheral vascular disease) ([facility] [address] 2017  Type II or unspecified type diabetes mellitus without mention of complication, not stated as uncontrolled 11/18/2010  Zoster Past Surgical History:  
Procedure Laterality Date  HX CORONARY STENT PLACEMENT    
 HX HEART CATHETERIZATION    
 [address] Prior to Admission medications Medication Sig Start Date End Date Taking? Authorizing Provider  
acyclovir (ZOVIRAX) 5 % ointment Apply  to affected area daily as needed. Yes Provider, Historical  
aspirin 81 mg chewable tablet Take 81 mg by mouth nightly. Yes Provider, Historical  
atorvastatin (LIPITOR) 20 mg tablet Take 20 mg by mouth nightly. Yes Provider, Historical  
azelastine (ASTEPRO) 0.15 % (205.5 mcg) 1 Spray by Both Nostrils route nightly. Yes Provider, Historical  
bumetanide (BUMEX) 1 mg tablet Take 1-2 mg by mouth daily. Takes 1 mg or 2 mg daily on alternating days   Yes Provider, Historical  
clotrimazole-betamethasone (LOTRISONE) topical cream Apply  to affected area two (2) times daily as needed for Skin Irritation or Other (Rash). Yes Provider, Historical  
insulin detemir U-100 (LEVEMIR U-100 INSULIN) 100 unit/mL injection by SubCUTAneous route two (2) times a day. Takes 20 units every morning, and takes 8-10 units every evening as needed/as directed based on blood sugar   Yes Provider, Historical  
mupirocin calcium (BACTROBAN) 2 % topical cream Apply  to affected area two (2) times daily as needed. Yes Provider, Historical  
nystatin (MYCOSTATIN) powder Apply  to affected area two (2) times daily as needed for Other (Rash).    Yes Provider, Historical  
glipiZIDE (GLUCOTROL) 5 mg tablet TAKE 2 TABLETS TWICE A DAY 1/28/20  Yes Loree Cortés MD  
 levoFLOXacin (LEVAQUIN) 250 mg tablet Take 1 Tab by mouth daily for 10 days. 20 Yes Fazal Salazar IV, MD  
dilTIAZem (CARDIZEM) 60 mg tablet 1 tablet in morning,1/2 tablet at lunch and 1 tablet at night 19  Yes Kenroy Caldera MD  
allopurinol (ZYLOPRIM) 100 mg tablet Take 100 mg by mouth daily. 10/9/19  Yes Provider, Historical  
apixaban (ELIQUIS) 2.5 mg tablet Take 1 Tab by mouth two (2) times a day. 10/29/19  Yes Kenroy Caldera MD  
omeprazole (PRILOSEC) 20 mg capsule TAKE 1 CAPSULE DAILY 10/29/19  Yes Kenroy Caldera MD  
insulin aspart U-100 (NOVOLOG FLEXPEN U-100 INSULIN) 100 unit/mL (3 mL) inpn INJECT 7 UNITS BEFORE EACH MEAL 3 TIMES A DAY OR AS   DIRECTED 19  Yes Fazal Salazar IV, MD  
metoprolol tartrate (LOPRESSOR) 100 mg IR tablet Take 1 Tab by mouth two (2) times a day. 19  Yes Kenroy Caldera MD  
enalapril (VASOTEC) 20 mg tablet TAKE 1 TABLET TWICE A DAY 19  Yes Kenroy Caldera MD  
budesonide-formoterol (SYMBICORT) 160-4.5 mcg/actuation HFAA Take 2 Puffs by inhalation two (2) times a day. After each use, rinse mouth with water 3/8/19  Yes Fazal Salazar IV, MD  
isosorbide mononitrate ER (IMDUR) 60 mg CR tablet TAKE 1 TABLET TWICE A DAY 18  Yes Kenroy Caldera MD  
cholecalciferol, vitamin D3, (VITAMIN D3) 2,000 unit tab Take 2,000 Units by mouth daily. Yes Provider, Historical  
nitroglycerin (NITROSTAT) 0.4 mg SL tablet 1 Tab by SubLINGual route every five (5) minutes as needed for Chest Pain. 17  Yes Kenroy Caldera MD  
  
History reviewed. No pertinent family history. Social History Tobacco Use  Smoking status: Former Smoker Packs/day: 0.80 Years: 20.00 Pack years: 16.00 Types: Cigarettes Last attempt to quit: 1986 Years since quittin.8  Smokeless tobacco: Never Used Substance Use Topics  Alcohol use: No  
  
ROS:  A comprehensive review of systems was negative except for that written in the HPI. Objective: 
Patient Vitals for the past 4 hrs: 
 BP Temp Pulse Resp SpO2  
20 0757 (!) 175/94 97.5 °F (36.4 °C) (!) 115 13 98 % 20 0739     98 % Temp (24hrs), Av.6 °F (36.4 °C), Min:97.3 °F (36.3 °C), Max:98 °F (36.7 °C) CVP:       
No intake or output data in the 24 hours ending 20 9344 Blood Sugar: 
Glucose Date Value Ref Range Status 2020 321 (H) 65 - 100 mg/dL Final  
2020 143 (H) 65 - 100 mg/dL Final  
2020 198 (H) 65 - 99 mg/dL Final  
2019 167 (H) 65 - 99 mg/dL Final  
2019 57 (L) 65 - 99 mg/dL Final  
 
Exam: 
Heavy Up in chair No resp distress NC AT 
MMM No accessory use Anicteric No wheezes or rales Tachy regular Protuberant soft Warm and dry No c/c/e Lab data was reviewed. Radiology images were independently viewed and available reports were reviewed. CXR - basilar atx Lab: 
Recent Labs 20 
0259 20 
1555 WBC 12.4* 12.3* HGB 14.2 14.1  231 * 135* K 3.9 3.7 CL 99 98  
CO2 25 31 BUN 23* 18  
CREA 1.39* 1.34* * 143* CA 8.8 9.1 TROIQ  --  <0.05 TBILI  --  0.6 SGOT  --  20  
 
ABG: 
No results for input(s): PHI, PCO2I, PO2I, HCO3I, SO2I, FIO2I in the last 72 hours. Results Procedure Component Value Units Date/Time URINE CULTURE HOLD SAMPLE [[id number]] Collected:  20 Order Status:  Completed Specimen:  Urine from Serum Updated:  20 184 Urine culture hold URINE ON HOLD IN MICROBIOLOGY DEPT FOR 3 DAYS. IF UNPRESERVED URINE IS SUBMITTED, IT CANNOT BE USED FOR ADDITIONAL TESTING AFTER 24 HRS, RECOLLECTION WILL BE REQUIRED.   
     
  
 
 
 
Bassem Guzman MD

## 2021-06-06 ENCOUNTER — HOSPITAL ENCOUNTER (EMERGENCY)
Facility: MEDICAL CENTER | Age: 31
End: 2021-06-06
Attending: EMERGENCY MEDICINE
Payer: MEDICAID

## 2021-06-06 VITALS
BODY MASS INDEX: 24.27 KG/M2 | WEIGHT: 128.53 LBS | SYSTOLIC BLOOD PRESSURE: 122 MMHG | HEIGHT: 61 IN | RESPIRATION RATE: 16 BRPM | DIASTOLIC BLOOD PRESSURE: 71 MMHG | TEMPERATURE: 98.3 F | OXYGEN SATURATION: 95 % | HEART RATE: 68 BPM

## 2021-06-06 DIAGNOSIS — A46 ERYSIPELAS: ICD-10-CM

## 2021-06-06 DIAGNOSIS — R22.0 FACIAL SWELLING: ICD-10-CM

## 2021-06-06 DIAGNOSIS — Z87.2 HISTORY OF ECZEMA: ICD-10-CM

## 2021-06-06 PROCEDURE — 96374 THER/PROPH/DIAG INJ IV PUSH: CPT

## 2021-06-06 PROCEDURE — 99284 EMERGENCY DEPT VISIT MOD MDM: CPT

## 2021-06-06 PROCEDURE — 700111 HCHG RX REV CODE 636 W/ 250 OVERRIDE (IP): Performed by: EMERGENCY MEDICINE

## 2021-06-06 RX ORDER — DEXAMETHASONE SODIUM PHOSPHATE 10 MG/ML
8 INJECTION, SOLUTION INTRAMUSCULAR; INTRAVENOUS ONCE
Status: COMPLETED | OUTPATIENT
Start: 2021-06-06 | End: 2021-06-06

## 2021-06-06 RX ORDER — AMOXICILLIN AND CLAVULANATE POTASSIUM 875; 125 MG/1; MG/1
1 TABLET, FILM COATED ORAL 2 TIMES DAILY
Qty: 14 TABLET | Refills: 0 | Status: SHIPPED | OUTPATIENT
Start: 2021-06-06 | End: 2021-06-13

## 2021-06-06 RX ADMIN — DEXAMETHASONE SODIUM PHOSPHATE 8 MG: 10 INJECTION INTRAMUSCULAR; INTRAVENOUS at 12:10

## 2021-06-06 NOTE — ED TRIAGE NOTES
Chief Complaint   Patient presents with   • Facial Swelling     Symptoms x 1 week gettting worse.  Started at back of head, painful and itchy, then moved down to neck and chest , now face involved.  Very swollen and itchy.  Burns. Today tongue tingly.  No known allergens
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
## 2021-07-26 ENCOUNTER — TELEPHONE (OUTPATIENT)
Dept: SCHEDULING | Facility: IMAGING CENTER | Age: 31
End: 2021-07-26

## 2021-07-27 ENCOUNTER — HOSPITAL ENCOUNTER (OUTPATIENT)
Dept: LAB | Facility: MEDICAL CENTER | Age: 31
End: 2021-07-27
Attending: PHYSICIAN ASSISTANT
Payer: MEDICAID

## 2021-07-27 ENCOUNTER — OFFICE VISIT (OUTPATIENT)
Dept: MEDICAL GROUP | Facility: CLINIC | Age: 31
End: 2021-07-27
Payer: MEDICAID

## 2021-07-27 VITALS
RESPIRATION RATE: 16 BRPM | HEART RATE: 72 BPM | WEIGHT: 129.4 LBS | SYSTOLIC BLOOD PRESSURE: 112 MMHG | TEMPERATURE: 97.9 F | DIASTOLIC BLOOD PRESSURE: 62 MMHG | BODY MASS INDEX: 24.43 KG/M2 | HEIGHT: 61 IN | OXYGEN SATURATION: 98 %

## 2021-07-27 DIAGNOSIS — A46 ERYSIPELAS: ICD-10-CM

## 2021-07-27 DIAGNOSIS — D50.9 IRON DEFICIENCY ANEMIA, UNSPECIFIED IRON DEFICIENCY ANEMIA TYPE: ICD-10-CM

## 2021-07-27 DIAGNOSIS — R21 RASH IN ADULT: ICD-10-CM

## 2021-07-27 DIAGNOSIS — Z00.00 BLOOD TESTS FOR ROUTINE GENERAL PHYSICAL EXAMINATION: ICD-10-CM

## 2021-07-27 DIAGNOSIS — L30.1 DYSHIDROTIC ECZEMA: ICD-10-CM

## 2021-07-27 LAB
BASOPHILS # BLD AUTO: 0.9 % (ref 0–1.8)
BASOPHILS # BLD: 0.06 K/UL (ref 0–0.12)
EOSINOPHIL # BLD AUTO: 0.84 K/UL (ref 0–0.51)
EOSINOPHIL NFR BLD: 12.1 % (ref 0–6.9)
ERYTHROCYTE [DISTWIDTH] IN BLOOD BY AUTOMATED COUNT: 42.2 FL (ref 35.9–50)
HCT VFR BLD AUTO: 43.7 % (ref 37–47)
HGB BLD-MCNC: 14.2 G/DL (ref 12–16)
IMM GRANULOCYTES # BLD AUTO: 0.01 K/UL (ref 0–0.11)
IMM GRANULOCYTES NFR BLD AUTO: 0.1 % (ref 0–0.9)
LYMPHOCYTES # BLD AUTO: 1.81 K/UL (ref 1–4.8)
LYMPHOCYTES NFR BLD: 26 % (ref 22–41)
MCH RBC QN AUTO: 32.4 PG (ref 27–33)
MCHC RBC AUTO-ENTMCNC: 32.5 G/DL (ref 33.6–35)
MCV RBC AUTO: 99.8 FL (ref 81.4–97.8)
MONOCYTES # BLD AUTO: 0.52 K/UL (ref 0–0.85)
MONOCYTES NFR BLD AUTO: 7.5 % (ref 0–13.4)
NEUTROPHILS # BLD AUTO: 3.71 K/UL (ref 2–7.15)
NEUTROPHILS NFR BLD: 53.4 % (ref 44–72)
NRBC # BLD AUTO: 0 K/UL
NRBC BLD-RTO: 0 /100 WBC
PLATELET # BLD AUTO: 271 K/UL (ref 164–446)
PMV BLD AUTO: 9.2 FL (ref 9–12.9)
RBC # BLD AUTO: 4.38 M/UL (ref 4.2–5.4)
WBC # BLD AUTO: 7 K/UL (ref 4.8–10.8)

## 2021-07-27 PROCEDURE — 80053 COMPREHEN METABOLIC PANEL: CPT

## 2021-07-27 PROCEDURE — 83540 ASSAY OF IRON: CPT

## 2021-07-27 PROCEDURE — 85025 COMPLETE CBC W/AUTO DIFF WBC: CPT

## 2021-07-27 PROCEDURE — 36415 COLL VENOUS BLD VENIPUNCTURE: CPT

## 2021-07-27 PROCEDURE — 86003 ALLG SPEC IGE CRUDE XTRC EA: CPT | Mod: 91

## 2021-07-27 PROCEDURE — 83550 IRON BINDING TEST: CPT

## 2021-07-27 PROCEDURE — 99204 OFFICE O/P NEW MOD 45 MIN: CPT | Performed by: PHYSICIAN ASSISTANT

## 2021-07-27 PROCEDURE — 82785 ASSAY OF IGE: CPT

## 2021-07-27 RX ORDER — PREDNISONE 20 MG/1
20 TABLET ORAL DAILY
Qty: 5 TABLET | Refills: 0 | Status: SHIPPED | OUTPATIENT
Start: 2021-07-27

## 2021-07-27 RX ORDER — BETAMETHASONE DIPROPIONATE 0.5 MG/G
CREAM TOPICAL
COMMUNITY
Start: 2021-07-25

## 2021-07-27 ASSESSMENT — PATIENT HEALTH QUESTIONNAIRE - PHQ9: CLINICAL INTERPRETATION OF PHQ2 SCORE: 0

## 2021-07-27 NOTE — PROGRESS NOTES
Chief Complaint   Patient presents with   • Establish Care   • Rash     since june 6th, swelling , itching, pain in back of head w/itchy scalp going into face        HPI:  Amy is a 30 y.o. female new patient presenting with the following:    Rash in adult  New onset rast that started approximately 6-8 weeks ago. Patient has a history of dyshidrotic eczema on both hands. Rash started on scalp/back of head with pain but no pruritis. Two days later she developed a pruritic fine papular rash around her neck and across chest and upper back. She also developed significant facial swelling, crusting of the skin on her ears. Was treated in the ER for erysipelas with resolution of the ear crusting and rash. Rash has since returned along with the pain on the occipital region of scalp. Scalp is dry and scaly but without weeping skin or lesions. No new contact with possible triggers. Uses scent free products. No food, drug or seasonal allergies.  Will order some allergy testing.  She will be given a short dose of prednisone bring the rash under better control.  She is not to take the prednisone until after the allergy labs have been drawn.  A referral has been placed to dermatology for further evaluation.    Dave  Seen in the emergency room in June for rash around her neck, across her chest, and across her upper back.  She was also noted to have significant facial swelling, honey colored crusting of the skin on her ears, and complaints of tongue tingling.  Was treated for erysipelas and an allergic reaction.  The medication resolved the problem for a few days.  Rash has since returned.  No facial swelling, crusting or weeping skin noted on exam today.      Patient Active Problem List    Diagnosis Date Noted   • Dyshidrotic eczema 07/27/2021   • Erysipelas 07/27/2021   • Rash in adult 07/27/2021       Current Outpatient Medications   Medication Sig Dispense Refill   • predniSONE (DELTASONE) 20 MG Tab Take 1 tablet by  mouth every day. 5 tablet 0   • Aug Betamethasone Dipropionate (DIPROLENE-AF) 0.05 % Cream  (Patient not taking: Reported on 7/27/2021)       No current facility-administered medications for this visit.         Allergies as of 07/27/2021   • (No Known Allergies)        Social History     Socioeconomic History   • Marital status:      Spouse name: Isreal   • Number of children: 3   • Years of education: Not on file   • Highest education level: Some college, no degree   Occupational History   • Not on file   Tobacco Use   • Smoking status: Never Smoker   • Smokeless tobacco: Never Used   Vaping Use   • Vaping Use: Never used   Substance and Sexual Activity   • Alcohol use: Yes     Alcohol/week: 0.6 oz     Types: 1 Cans of beer per week     Comment: 1- 2 drinks a month   • Drug use: No   • Sexual activity: Yes     Partners: Male     Birth control/protection: None   Other Topics Concern   • Not on file   Social History Narrative   • Not on file     Social Determinants of Health     Financial Resource Strain:    • Difficulty of Paying Living Expenses:    Food Insecurity:    • Worried About Running Out of Food in the Last Year:    • Ran Out of Food in the Last Year:    Transportation Needs:    • Lack of Transportation (Medical):    • Lack of Transportation (Non-Medical):    Physical Activity:    • Days of Exercise per Week:    • Minutes of Exercise per Session:    Stress:    • Feeling of Stress :    Social Connections:    • Frequency of Communication with Friends and Family:    • Frequency of Social Gatherings with Friends and Family:    • Attends Orthodoxy Services:    • Active Member of Clubs or Organizations:    • Attends Club or Organization Meetings:    • Marital Status:    Intimate Partner Violence:    • Fear of Current or Ex-Partner:    • Emotionally Abused:    • Physically Abused:    • Sexually Abused:        Family History   Problem Relation Age of Onset   • Hypertension Maternal Grandmother    • Cancer  Maternal Grandmother         breast   • Kidney cancer Maternal Grandmother    • Diabetes Maternal Grandmother    • Hyperlipidemia Maternal Grandmother    • Other Maternal Grandmother         gout   • Allergies Mother    • Arthritis Mother    • Cancer Father         Pancreatic cancer   • Cancer Paternal Grandmother         bladder   • Asthma Half-brother    • Other Half-brother         hypoglycemia   • No Known Problems Half-brother        Past Surgical History:   Procedure Laterality Date   • OTHER  2015    wisdom teeth       Review of Systems:   Constitutional: Negative for fever, chills, weight change, fatigue, loss of appetite.  HNT: Negative for nosebleeds, congestion, odynophagia, sore throat or changes in taste.    Eyes: Negative for vision changes.   Ears: Negative for recent hearing changes, pain or discharge.  Neck: Negative for pain, swelling, lumps or goiter.  Respiratory: Negative for cough, sputum production, shortness of breath and wheezing.    Cardiovascular: Negative for chest pain, palpitations, orthopnea and leg swelling.   Gastrointestinal: Negative for constipation, diarrhea, heartburn, dysphagia, nausea, vomiting or abdominal pain.   Genitourinary: Negative for dysuria, urgency and frequency.   Musculoskeletal: Negative for myalgias, joint pain, and back pain.  Skin: Positive for fine, pruritic papular rash around neck, across upper chest, across the upper back.  Positive for dyshidrotic eczema bilateral palms.  Negative for other skin, hair or nail changes.   Neurological: Negative for dizziness, tingling, tremors, sensory change, gait/coordination changes, focal weakness and headaches.   Endo/Heme/Allergies: Does not bruise/bleed easily.   Psychiatric/Behavioral: Negative for depression, suicidal ideas and memory loss.  The patient is not nervous/anxious and does not have insomnia.        Physical Exam:  /62   Pulse 72   Temp 36.6 °C (97.9 °F) (Temporal)   Resp 16   Ht 1.549 m (5'  "1\")   Wt 58.7 kg (129 lb 6.4 oz)   SpO2 98%   BMI 24.45 kg/m²    General:  Well nourished, well developed female. With a Body mass index is 24.45 kg/m². No apparent distress.  Eyes: EOM intact, PERRL, conjunctiva non-injected, sclera non-icteric.  Neck: Neck supple with no cervical lymphadenopathy, JVD, palpable thyroid nodules or carotid bruits.  Pulmonary: Clear to ausculation bilaterally. Normal effort. No rales, ronchi, or wheezing.  Cardiovascular: Regular rate and rhythm without murmur, rub or gallop.   Extremities: Full range of motion. Warm and well perfused with no edema.  Skin: Fine, pruritic papular rash around neck, across the upper chest, across the upper back.  Dry flaky skin on scalp without erythema or exudate.  Neuro: Cranial nerves I-XII grossly intact.  Psych: Alert and oriented x 3.  Appropriately dressed. Mood and affect appropriate.    Assessment/Plan:    1. Dyshidrotic eczema  REFERRAL TO DERMATOLOGY    CANCELED: ALLERGENS 36 + IGE   2. Erysipelas  REFERRAL TO DERMATOLOGY    CANCELED: ALLERGENS 36 + IGE   3. Rash in adult  REFERRAL TO DERMATOLOGY    predniSONE (DELTASONE) 20 MG Tab    CANCELED: ALLERGENS 36 + IGE   4. Iron deficiency anemia, unspecified iron deficiency anemia type  CBC WITH DIFFERENTIAL    IRON/TOTAL IRON BIND   5. Blood tests for routine general physical examination  Comp Metabolic Panel    CANCELED: Comp Metabolic Panel       Reviewed risks and benefits of treatment plan. Patient verbally agrees to plan of care.     Return in about 2 weeks (around 8/10/2021) for f/u labs.      Please note that this dictation was created using voice recognition software. I have made every reasonable attempt to correct obvious errors, but I expect that there are errors of grammar and possibly content that I did not discover before finalizing the note.  "

## 2021-07-28 LAB
ALBUMIN SERPL BCP-MCNC: 4.6 G/DL (ref 3.2–4.9)
ALBUMIN/GLOB SERPL: 1.6 G/DL
ALP SERPL-CCNC: 59 U/L (ref 30–99)
ALT SERPL-CCNC: 14 U/L (ref 2–50)
ANION GAP SERPL CALC-SCNC: 12 MMOL/L (ref 7–16)
AST SERPL-CCNC: 22 U/L (ref 12–45)
BILIRUB SERPL-MCNC: 1.2 MG/DL (ref 0.1–1.5)
BUN SERPL-MCNC: 14 MG/DL (ref 8–22)
CALCIUM SERPL-MCNC: 9.5 MG/DL (ref 8.5–10.5)
CHLORIDE SERPL-SCNC: 105 MMOL/L (ref 96–112)
CO2 SERPL-SCNC: 24 MMOL/L (ref 20–33)
CREAT SERPL-MCNC: 0.61 MG/DL (ref 0.5–1.4)
GLOBULIN SER CALC-MCNC: 2.9 G/DL (ref 1.9–3.5)
GLUCOSE SERPL-MCNC: 105 MG/DL (ref 65–99)
IRON SATN MFR SERPL: 37 % (ref 15–55)
IRON SERPL-MCNC: 93 UG/DL (ref 40–170)
POTASSIUM SERPL-SCNC: 4.1 MMOL/L (ref 3.6–5.5)
PROT SERPL-MCNC: 7.5 G/DL (ref 6–8.2)
SODIUM SERPL-SCNC: 141 MMOL/L (ref 135–145)
TIBC SERPL-MCNC: 254 UG/DL (ref 250–450)
UIBC SERPL-MCNC: 161 UG/DL (ref 110–370)

## 2021-07-28 NOTE — ASSESSMENT & PLAN NOTE
New onset rast that started approximately 6-8 weeks ago. Patient has a history of dyshidrotic eczema on both hands. Rash started on scalp/back of head with pain but no pruritis. Two days later she developed a pruritic fine papular rash around her neck and across chest and upper back. She also developed significant facial swelling, crusting of the skin on her ears. Was treated in the ER for erysipelas with resolution of the ear crusting and rash. Rash has since returned along with the pain on the occipital region of scalp. Scalp is dry and scaly but without weeping skin or lesions. No new contact with possible triggers. Uses scent free products. No food, drug or seasonal allergies.  Will order some allergy testing.  She will be given a short dose of prednisone bring the rash under better control.  She is not to take the prednisone until after the allergy labs have been drawn.  A referral has been placed to dermatology for further evaluation.

## 2021-07-28 NOTE — ASSESSMENT & PLAN NOTE
Seen in the emergency room in June for rash around her neck, across her chest, and across her upper back.  She was also noted to have significant facial swelling, honey colored crusting of the skin on her ears, and complaints of tongue tingling.  Was treated for erysipelas and an allergic reaction.  The medication resolved the problem for a few days.  Rash has since returned.  No facial swelling, crusting or weeping skin noted on exam today.

## 2021-07-28 NOTE — ASSESSMENT & PLAN NOTE
new onset rast that started approximately 6-8 weeks ago. Patient has a history of dyshidrotic eczema on both hands. Rash started on scalp/back of head with pain but no pruritis. Two days later she developed a pruritic fine papular rash around her neck and across chest and upper back. She also developed significant facial swelling, crusting of the skin on her ears. Was treated in the ER for erysipelas with resolution of the ear crusting and rash. Rash has since returned along with the pain on the occipital region of scalp. Scalp is dry and scaly but without weeping skin or lesions. No new contact with possible triggers. Uses scent free products. No food, drug or seasonal allergies.

## 2021-07-31 LAB — IGE SERPL-ACNC: 6 KU/L

## 2021-08-01 LAB
A ALTERNATA IGE QN: <0.1 KU/L
A FUMIGATUS IGE QN: <0.1 KU/L
AMER ROACH IGE QN: <0.1 KU/L
AMER SYCAMORE IGE QN: <0.1 KU/L
BAHIA GRASS IGE QN: <0.1 KU/L
BERMUDA GRASS IGE QN: <0.1 KU/L
C ALBICANS IGE QN: <0.1 KU/L
C SPHAEROSPERMUM IGE QN: <0.1 KU/L
CALIF WALNUT IGE QN: <0.1 KU/L
CAT DANDER IGE QN: <0.1 KU/L
CMN PIGWEED IGE QN: <0.1 KU/L
COW MILK IGE QN: <0.1 KU/L
D FARINAE IGE QN: <0.1 KU/L
D PTERONYSS IGE QN: <0.1 KU/L
DEPRECATED MISC ALLERGEN IGE RAST QL: NORMAL
DOG DANDER IGE QN: <0.1 KU/L
GUM-TREE IGE QN: <0.1 KU/L
JOHNSON GRASS IGE QN: <0.1 KU/L
NETTLE IGE QN: <0.1 KU/L
OLIVE POLN IGE QN: <0.1 KU/L
P NOTATUM IGE QN: <0.1 KU/L
PEANUT IGE QN: <0.1 KU/L
PECAN/HICK TREE IGE QN: <0.1 KU/L
PER RYE GRASS IGE QN: <0.1 KU/L
S ROSTRATA IGE QN: <0.1 KU/L
SALTWORT IGE QN: <0.1 KU/L
SILVER BIRCH IGE QN: <0.1 KU/L
SOYBEAN IGE QN: <0.1 KU/L
WATTLE IGE AB [UNITS/VOLUME] IN SERUM: <0.1 KU/L
WEST RAGWEED IGE QN: <0.1 KU/L
WHEAT IGE QN: <0.1 KU/L
WHITE MULBERRY IGE QN: <0.1 KU/L
WHITE OAK IGE QN: <0.1 KU/L
WHOLE EGG IGE QN: <0.1 KU/L
WORMWOOD IGE QN: <0.1 KU/L

## 2021-10-06 ENCOUNTER — APPOINTMENT (OUTPATIENT)
Dept: MEDICAL GROUP | Facility: CLINIC | Age: 31
End: 2021-10-06
Payer: MEDICAID

## 2023-09-11 ENCOUNTER — DOCUMENTATION (OUTPATIENT)
Dept: HEALTH INFORMATION MANAGEMENT | Facility: OTHER | Age: 33
End: 2023-09-11
Payer: COMMERCIAL

## 2023-11-21 ENCOUNTER — TELEPHONE (OUTPATIENT)
Dept: HEALTH INFORMATION MANAGEMENT | Facility: OTHER | Age: 33
End: 2023-11-21
Payer: COMMERCIAL

## 2024-03-01 ENCOUNTER — HOSPITAL ENCOUNTER (OUTPATIENT)
Dept: LAB | Facility: MEDICAL CENTER | Age: 34
End: 2024-03-01
Payer: COMMERCIAL

## 2024-03-01 LAB
BASOPHILS # BLD AUTO: 0.9 % (ref 0–1.8)
BASOPHILS # BLD: 0.05 K/UL (ref 0–0.12)
EOSINOPHIL # BLD AUTO: 0.08 K/UL (ref 0–0.51)
EOSINOPHIL NFR BLD: 1.5 % (ref 0–6.9)
ERYTHROCYTE [DISTWIDTH] IN BLOOD BY AUTOMATED COUNT: 41.8 FL (ref 35.9–50)
ERYTHROCYTE [SEDIMENTATION RATE] IN BLOOD BY WESTERGREN METHOD: 5 MM/HOUR (ref 0–25)
HCT VFR BLD AUTO: 43.2 % (ref 37–47)
HGB BLD-MCNC: 14 G/DL (ref 12–16)
IMM GRANULOCYTES # BLD AUTO: 0.01 K/UL (ref 0–0.11)
IMM GRANULOCYTES NFR BLD AUTO: 0.2 % (ref 0–0.9)
LYMPHOCYTES # BLD AUTO: 1.64 K/UL (ref 1–4.8)
LYMPHOCYTES NFR BLD: 30.6 % (ref 22–41)
MCH RBC QN AUTO: 31.5 PG (ref 27–33)
MCHC RBC AUTO-ENTMCNC: 32.4 G/DL (ref 32.2–35.5)
MCV RBC AUTO: 97.3 FL (ref 81.4–97.8)
MONOCYTES # BLD AUTO: 0.57 K/UL (ref 0–0.85)
MONOCYTES NFR BLD AUTO: 10.6 % (ref 0–13.4)
NEUTROPHILS # BLD AUTO: 3.01 K/UL (ref 1.82–7.42)
NEUTROPHILS NFR BLD: 56.2 % (ref 44–72)
NRBC # BLD AUTO: 0 K/UL
NRBC BLD-RTO: 0 /100 WBC (ref 0–0.2)
PLATELET # BLD AUTO: 260 K/UL (ref 164–446)
PMV BLD AUTO: 9.2 FL (ref 9–12.9)
RBC # BLD AUTO: 4.44 M/UL (ref 4.2–5.4)
WBC # BLD AUTO: 5.4 K/UL (ref 4.8–10.8)

## 2024-03-01 PROCEDURE — 84443 ASSAY THYROID STIM HORMONE: CPT

## 2024-03-01 PROCEDURE — 86812 HLA TYPING A B OR C: CPT

## 2024-03-01 PROCEDURE — 82306 VITAMIN D 25 HYDROXY: CPT

## 2024-03-01 PROCEDURE — 82607 VITAMIN B-12: CPT

## 2024-03-01 PROCEDURE — 86003 ALLG SPEC IGE CRUDE XTRC EA: CPT | Mod: 91

## 2024-03-01 PROCEDURE — 84550 ASSAY OF BLOOD/URIC ACID: CPT

## 2024-03-01 PROCEDURE — 85598 HEXAGNAL PHOSPH PLTLT NEUTRL: CPT

## 2024-03-01 PROCEDURE — 80053 COMPREHEN METABOLIC PANEL: CPT

## 2024-03-01 PROCEDURE — 84439 ASSAY OF FREE THYROXINE: CPT

## 2024-03-01 PROCEDURE — 80061 LIPID PANEL: CPT

## 2024-03-01 PROCEDURE — 85613 RUSSELL VIPER VENOM DILUTED: CPT | Mod: 91

## 2024-03-01 PROCEDURE — 85610 PROTHROMBIN TIME: CPT

## 2024-03-01 PROCEDURE — 82784 ASSAY IGA/IGD/IGG/IGM EACH: CPT

## 2024-03-01 PROCEDURE — 36415 COLL VENOUS BLD VENIPUNCTURE: CPT

## 2024-03-01 PROCEDURE — 85670 THROMBIN TIME PLASMA: CPT

## 2024-03-01 PROCEDURE — 83550 IRON BINDING TEST: CPT

## 2024-03-01 PROCEDURE — 86431 RHEUMATOID FACTOR QUANT: CPT

## 2024-03-01 PROCEDURE — 83036 HEMOGLOBIN GLYCOSYLATED A1C: CPT

## 2024-03-01 PROCEDURE — 83540 ASSAY OF IRON: CPT

## 2024-03-01 PROCEDURE — 86141 C-REACTIVE PROTEIN HS: CPT

## 2024-03-01 PROCEDURE — 82746 ASSAY OF FOLIC ACID SERUM: CPT

## 2024-03-01 PROCEDURE — 86200 CCP ANTIBODY: CPT

## 2024-03-01 PROCEDURE — 86800 THYROGLOBULIN ANTIBODY: CPT

## 2024-03-01 PROCEDURE — 86256 FLUORESCENT ANTIBODY TITER: CPT

## 2024-03-01 PROCEDURE — 85652 RBC SED RATE AUTOMATED: CPT

## 2024-03-01 PROCEDURE — 84481 FREE ASSAY (FT-3): CPT

## 2024-03-01 PROCEDURE — 82728 ASSAY OF FERRITIN: CPT

## 2024-03-01 PROCEDURE — 85730 THROMBOPLASTIN TIME PARTIAL: CPT

## 2024-03-01 PROCEDURE — 86038 ANTINUCLEAR ANTIBODIES: CPT

## 2024-03-01 PROCEDURE — 85520 HEPARIN ASSAY: CPT

## 2024-03-01 PROCEDURE — 85025 COMPLETE CBC W/AUTO DIFF WBC: CPT

## 2024-03-01 PROCEDURE — 82785 ASSAY OF IGE: CPT

## 2024-03-02 LAB
25(OH)D3 SERPL-MCNC: 23 NG/ML (ref 30–100)
ALBUMIN SERPL BCP-MCNC: 4.4 G/DL (ref 3.2–4.9)
ALBUMIN/GLOB SERPL: 1.5 G/DL
ALP SERPL-CCNC: 50 U/L (ref 30–99)
ALT SERPL-CCNC: 13 U/L (ref 2–50)
ANION GAP SERPL CALC-SCNC: 13 MMOL/L (ref 7–16)
AST SERPL-CCNC: 22 U/L (ref 12–45)
BILIRUB SERPL-MCNC: 1.7 MG/DL (ref 0.1–1.5)
BUN SERPL-MCNC: 12 MG/DL (ref 8–22)
CALCIUM ALBUM COR SERPL-MCNC: 8.6 MG/DL (ref 8.5–10.5)
CALCIUM SERPL-MCNC: 8.9 MG/DL (ref 8.5–10.5)
CHLORIDE SERPL-SCNC: 103 MMOL/L (ref 96–112)
CHOLEST SERPL-MCNC: 144 MG/DL (ref 100–199)
CO2 SERPL-SCNC: 21 MMOL/L (ref 20–33)
CREAT SERPL-MCNC: 0.56 MG/DL (ref 0.5–1.4)
CRP SERPL HS-MCNC: 0.8 MG/L (ref 0–3)
EST. AVERAGE GLUCOSE BLD GHB EST-MCNC: 74 MG/DL
FASTING STATUS PATIENT QL REPORTED: NORMAL
FERRITIN SERPL-MCNC: 94.2 NG/ML (ref 10–291)
FOLATE SERPL-MCNC: 10.8 NG/ML
GFR SERPLBLD CREATININE-BSD FMLA CKD-EPI: 125 ML/MIN/1.73 M 2
GLOBULIN SER CALC-MCNC: 3 G/DL (ref 1.9–3.5)
GLUCOSE SERPL-MCNC: 78 MG/DL (ref 65–99)
HBA1C MFR BLD: 4.2 % (ref 4–5.6)
HDLC SERPL-MCNC: 53 MG/DL
IRON SATN MFR SERPL: 83 % (ref 15–55)
IRON SERPL-MCNC: 215 UG/DL (ref 40–170)
LDLC SERPL CALC-MCNC: 80 MG/DL
POTASSIUM SERPL-SCNC: 4.2 MMOL/L (ref 3.6–5.5)
PROT SERPL-MCNC: 7.4 G/DL (ref 6–8.2)
RHEUMATOID FACT SER IA-ACNC: <10 IU/ML (ref 0–14)
SODIUM SERPL-SCNC: 137 MMOL/L (ref 135–145)
T3FREE SERPL-MCNC: 2.89 PG/ML (ref 2–4.4)
T4 FREE SERPL-MCNC: 1.2 NG/DL (ref 0.93–1.7)
TIBC SERPL-MCNC: 260 UG/DL (ref 250–450)
TRIGL SERPL-MCNC: 55 MG/DL (ref 0–149)
TSH SERPL DL<=0.005 MIU/L-ACNC: 1.55 UIU/ML (ref 0.38–5.33)
UIBC SERPL-MCNC: 45 UG/DL (ref 110–370)
URATE SERPL-MCNC: 3.8 MG/DL (ref 1.9–8.2)
VIT B12 SERPL-MCNC: 838 PG/ML (ref 211–911)

## 2024-03-04 LAB
ALMOND IGE QN: <0.1 KU/L
AVOCADO IGE QN: <0.1 KU/L
BANANA IGE QN: <0.1 KU/L
CCP IGA+IGG SERPL IA-ACNC: 8 UNITS (ref 0–19)
CELERY IGE QN: <0.1 KU/L
CHESTNUT IGE QN: <0.1 KU/L
COCONUT IGE QN: <0.1 KU/L
GRAPE IGE QN: <0.1 KU/L
IGA SERPL-MCNC: 160 MG/DL (ref 68–408)
IGE SERPL-ACNC: 3 KU/L
IGG SERPL-MCNC: 1393 MG/DL (ref 768–1632)
IGM SERPL-MCNC: 109 MG/DL (ref 35–263)
KIWIFRUIT IGE QN: <0.1 KU/L
NUCLEAR IGG SER QL IA: NORMAL
PAPAYA IGE QN: <0.1 KU/L
PEANUT IGE QN: <0.1 KU/L
PECAN/HICK NUT IGE QN: <0.1 KU/L
POTATO IGE QN: <0.1 KU/L
SESAME SEED IGE QN: <0.1 KU/L
THYROGLOB AB SERPL-ACNC: 1.9 IU/ML (ref 0–4)
TOMATO IGE QN: <0.1 KU/L
WATERMELON IGE QN: <0.1 KU/L

## 2024-03-05 LAB
COW MILK IGE QN: <0.1 KU/L
DEPRECATED MISC ALLERGEN IGE RAST QL: NORMAL
DSDNA IGG TITR SER CLIF: NORMAL {TITER}
EGG WHITE IGE QN: <0.1 KU/L
HLA-B27 QL FC: NEGATIVE
OAT IGE QN: <0.1 KU/L
SOYBEAN IGE QN: <0.1 KU/L
WHEAT IGE QN: <0.1 KU/L

## 2024-03-06 LAB
APTT SCREEN TO CONFIRM RATIO: 1.52 {RATIO}
CONFIRM DRVVT STA-STACLOT: 19.3 S
DRVVT SCREEN TO CONFIRM RATIO: 1.09 {RATIO}
DRVVT SCREEN TO CONFIRM RATIO: ABNORMAL {RATIO}
DRVVT/DRVVT CFM P DOAC NEUT NORM PPP-RTO: ABNORMAL {RATIO}
HEPARIN NT PPP QL: ABNORMAL
LA 3 SCREEN W REFLEX-IMP: ABNORMAL
LMW HEPARIN IND PLT AB SER QL: ABNORMAL
MIXING DRVVT: ABNORMAL S
PROTHROMBIN TIME: 13.4 S (ref 12–15.5)
SCREEN APTT: ABNORMAL S
THROMBIN TIME: 17.8 S

## 2024-07-19 ENCOUNTER — HOSPITAL ENCOUNTER (OUTPATIENT)
Dept: LAB | Facility: MEDICAL CENTER | Age: 34
End: 2024-07-19
Payer: COMMERCIAL

## 2024-07-19 PROCEDURE — 36415 COLL VENOUS BLD VENIPUNCTURE: CPT

## 2024-07-19 PROCEDURE — 81256 HFE GENE: CPT

## 2024-07-24 LAB
HFE GENE MUT ANL BLD/T: NORMAL
HFE P.C282Y BLD/T QL: NEGATIVE
HFE P.H63D BLD/T QL: NORMAL
HFE P.S65C BLD/T QL: NEGATIVE

## 2024-09-23 ENCOUNTER — HOSPITAL ENCOUNTER (OUTPATIENT)
Dept: HEMATOLOGY ONCOLOGY | Facility: MEDICAL CENTER | Age: 34
End: 2024-09-23
Attending: INTERNAL MEDICINE
Payer: COMMERCIAL

## 2024-09-23 VITALS
DIASTOLIC BLOOD PRESSURE: 66 MMHG | TEMPERATURE: 98.8 F | HEIGHT: 61 IN | BODY MASS INDEX: 26.06 KG/M2 | WEIGHT: 138 LBS | SYSTOLIC BLOOD PRESSURE: 106 MMHG | HEART RATE: 75 BPM | OXYGEN SATURATION: 100 %

## 2024-09-23 DIAGNOSIS — E83.110 HEMOCHROMATOSIS ASSOCIATED WITH MUTATION IN HFE GENE (HCC): ICD-10-CM

## 2024-09-23 PROCEDURE — 99212 OFFICE O/P EST SF 10 MIN: CPT | Performed by: INTERNAL MEDICINE

## 2024-09-23 PROCEDURE — 99214 OFFICE O/P EST MOD 30 MIN: CPT | Performed by: INTERNAL MEDICINE

## 2024-09-23 ASSESSMENT — FIBROSIS 4 INDEX: FIB4 SCORE: 0.75

## 2024-09-23 ASSESSMENT — ENCOUNTER SYMPTOMS
DIZZINESS: 0
WEIGHT LOSS: 0
HEARTBURN: 0
SPUTUM PRODUCTION: 0
SORE THROAT: 0
COUGH: 0
CHILLS: 0
ORTHOPNEA: 0
BRUISES/BLEEDS EASILY: 0
BLURRED VISION: 0
SHORTNESS OF BREATH: 0
TINGLING: 0
VOMITING: 0
PALPITATIONS: 0
MEMORY LOSS: 0
FOCAL WEAKNESS: 0
SENSORY CHANGE: 0
TREMORS: 0
NAUSEA: 0
DEPRESSION: 0
HEADACHES: 0
FEVER: 0
NECK PAIN: 0
ABDOMINAL PAIN: 0
WHEEZING: 0

## 2024-09-23 ASSESSMENT — PAIN SCALES - GENERAL: PAINLEVEL: NO PAIN

## 2024-09-23 NOTE — PROGRESS NOTES
Consult:  Hematology/Oncology      Referring Physician: See chart  Primary Care:  HUSSEIN Cartagena    Diagnosis: Hemochromatosis, heterozygote H63D mutation    Chief Complaint:  New Patient (Hemochromatosis/ )      History of Presenting Illness:  Amy Ireland is a 34 y.o. female who noted a rash approximately 6 months ago and had some other systemic symptoms.  Had a very extensive workup concern for leuk lupus.  Patient ultimately came back with positive for lupus anticoagulant as well as heterozygote for hemochromatosis H63D mutation.  Iron studies show in the past she has had a relatively stable iron currently she has an elevated iron no ferritin is available.  Patient presents for further workup and treatment as indicated.      Past Medical History:   Diagnosis Date   • Anemia    • Depression    • Dyspareunia, female 6/20/2013   • Pregnant state, incidental        Past Surgical History:   Procedure Laterality Date   • OTHER  2015    wisdom teeth       Social History     Tobacco Use   • Smoking status: Never   • Smokeless tobacco: Never   Vaping Use   • Vaping status: Never Used   Substance Use Topics   • Alcohol use: Yes     Alcohol/week: 0.6 oz     Types: 1 Cans of beer per week     Comment: 1- 2 drinks a month   • Drug use: No        Family History   Problem Relation Age of Onset   • Hypertension Maternal Grandmother    • Cancer Maternal Grandmother         breast   • Kidney cancer Maternal Grandmother    • Diabetes Maternal Grandmother    • Hyperlipidemia Maternal Grandmother    • Other Maternal Grandmother         gout   • Allergies Mother    • Arthritis Mother    • Cancer Father         Pancreatic cancer   • Cancer Paternal Grandmother         bladder   • Asthma Half-brother    • Other Half-brother         hypoglycemia   • No Known Problems Half-brother        Allergies as of 09/23/2024   • (No Known Allergies)         Current Outpatient Medications:   •  Aug Betamethasone Dipropionate  "(DIPROLENE-AF) 0.05 % Cream, , Disp: , Rfl:   •  predniSONE (DELTASONE) 20 MG Tab, Take 1 tablet by mouth every day. (Patient not taking: Reported on 9/23/2024), Disp: 5 tablet, Rfl: 0    Review of Systems:  Review of Systems   Constitutional:  Negative for chills, fever, malaise/fatigue and weight loss.   HENT:  Negative for congestion, ear pain, nosebleeds and sore throat.    Eyes:  Negative for blurred vision.   Respiratory:  Negative for cough, sputum production, shortness of breath and wheezing.    Cardiovascular:  Negative for chest pain, palpitations, orthopnea and leg swelling.   Gastrointestinal:  Negative for abdominal pain, heartburn, nausea and vomiting.   Genitourinary:  Negative for dysuria, frequency and urgency.   Musculoskeletal:  Negative for neck pain.   Neurological:  Negative for dizziness, tingling, tremors, sensory change, focal weakness and headaches.   Endo/Heme/Allergies:  Does not bruise/bleed easily.   Psychiatric/Behavioral:  Negative for depression, memory loss and suicidal ideas.    All other systems reviewed and are negative.         Physical Exam:  Vitals:    09/23/24 1102   BP: 106/66   BP Location: Left arm   Patient Position: Sitting   BP Cuff Size: Adult   Pulse: 75   Temp: 37.1 °C (98.8 °F)   TempSrc: Temporal   SpO2: 100%   Weight: 62.6 kg (138 lb)   Height: 1.549 m (5' 0.98\")               Physical Exam  Constitutional:       General: She is not in acute distress.  HENT:      Head: Normocephalic.   Eyes:      Conjunctiva/sclera: Conjunctivae normal.   Cardiovascular:      Rate and Rhythm: Normal rate and regular rhythm.      Heart sounds: Normal heart sounds.   Pulmonary:      Effort: Pulmonary effort is normal.      Breath sounds: Normal breath sounds.   Abdominal:      General: Bowel sounds are normal.      Palpations: Abdomen is soft.   Musculoskeletal:         General: Normal range of motion.   Lymphadenopathy:      Cervical: No cervical adenopathy.   Skin:     General: " Skin is dry.   Neurological:      General: No focal deficit present.      Mental Status: She is oriented to person, place, and time.   Psychiatric:         Thought Content: Thought content normal.        Depression Screening    Little interest or pleasure in doing things?      Feeling down, depressed , or hopeless?     Trouble falling or staying asleep, or sleeping too much?      Feeling tired or having little energy?      Poor appetite or overeating?      Feeling bad about yourself - or that you are a failure or have let yourself or your family down?     Trouble concentrating on things, such as reading the newspaper or watching television?     Moving or speaking so slowly that other people could have noticed.  Or the opposite - being so fidgety or restless that you have been moving around a lot more than usual?      Thoughts that you would be better off dead, or of hurting yourself?      Patient Health Questionnaire Score:       If depressive symptoms identified deferred to follow up visit unless specifically addressed in assesment and plan.    Labs:  No visits with results within 1 Month(s) from this visit.   Latest known visit with results is:   Hospital Outpatient Visit on 07/19/2024   Component Date Value Ref Range Status   • C282Y Mutation 07/19/2024 Negative   Final   • H63D Mutation 07/19/2024 Heterozygous   Final   • S65C Mutation 07/19/2024 Negative   Final   • Hemochrom Interp 07/19/2024 See Note   Final    Comment: Indication for testing: Carrier screening or diagnostic testing  for hereditary hemochromatosis.    Hemochromatosis Interpretive Results:  Heterozygous H63D:  C282Y: Negative - The patient is negative for the HFE C282Y  mutation.  H63D: Heterozygous - The patient is heterozygous for the HFE H63D  mutation and the normal allele.  S65C: Negative - The patient is negative for the HFE S65C mutation.  This genotype has not been associated with symptoms of hereditary  hemochromatosis.    This result  has been reviewed and approved by Nasim Price, Ph.D.  BACKGROUND INFORMATION: Hemochromatosis (HFE) 3 Mutations  CHARACTERISTICS: Disorder of iron metabolism resulting in  excessive iron storage leading to increased skin pigmentation,  arthritis, hypogonadism, diabetes mellitus, heart  arrhythmias/failure, cirrhosis and liver carcinoma.  INCIDENCE: One in 300 individuals of [place]  descent;  unknown in other ethnicities.  INHERITANCE: Autosomal recessive.  PENETRANCE: 5 percent of C282Y tova                           ozygotes, 1 percent of  C282Y/H63D compound heterozygotes and rare H63D homozygotes  develop clinical symptoms.  CAUSE: Two pathogenic HFE gene mutations on opposite chromosomes.  MUTATIONS TESTED: p.C282Y (c.845G>A), p.H63D (c.187C>G), and  p.S65C (c.193A>T).  CLINICAL SENSITIVITY: 85 percent of hereditary hemochromatosis in  Northern Europeans is caused by C282Y homozygosity and 5 percent  by C282Y/H63D compound heterozygosity.  METHODOLOGY: PCR and fluorescence monitoring.  ANALYTICAL SENSITIVTY AND SPECIFICITY: 99 percent.  LIMITATIONS: HFE mutations, other than those targeted, will not be  detected. Diagnostic errors can occur due to rare sequence  variations.  This test was developed and its performance characteristics  determined by [facility]. It has not been cleared or  approved by the [place] Food and Drug Administration. This test was  performed in a CLIA certified laboratory and is intended for  clinical purposes.  Counseling and informed consent are recommended for yung                           ic  testing. Consent forms are available online.  Performed By: [facility]  [address] [place] [id number]  : Andre Keys MD, PhD  CLIA Number: [id number]         Imaging:   All listed images below have been independently reviewed by me. I agree with the findings as summarized below:    No results found.     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  Patient with mild abdominal discomfort right upper quadrant will order MRI abdomen.  Patient also with migratory arthralgias.  Check iron and ferritin and check full lupus anticoagulant panel.  Return visit in 6 weeks.    Any questions and concerns raised by the patient were answered to the best of my ability. Thank you for allowing me to participate in the care for this patient. Please feel free to contact me for any questions or concerns.     Grover Godwin M.D.

## 2024-09-30 ENCOUNTER — HOSPITAL ENCOUNTER (OUTPATIENT)
Dept: LAB | Facility: MEDICAL CENTER | Age: 34
End: 2024-09-30
Attending: INTERNAL MEDICINE
Payer: COMMERCIAL

## 2024-09-30 DIAGNOSIS — E83.110 HEMOCHROMATOSIS ASSOCIATED WITH MUTATION IN HFE GENE (HCC): ICD-10-CM

## 2024-09-30 LAB
BASOPHILS # BLD AUTO: 0.7 % (ref 0–1.8)
BASOPHILS # BLD: 0.05 K/UL (ref 0–0.12)
EOSINOPHIL # BLD AUTO: 0.07 K/UL (ref 0–0.51)
EOSINOPHIL NFR BLD: 0.9 % (ref 0–6.9)
ERYTHROCYTE [DISTWIDTH] IN BLOOD BY AUTOMATED COUNT: 42.3 FL (ref 35.9–50)
FERRITIN SERPL-MCNC: 91.4 NG/ML (ref 10–291)
HCT VFR BLD AUTO: 39.5 % (ref 37–47)
HGB BLD-MCNC: 12.8 G/DL (ref 12–16)
IMM GRANULOCYTES # BLD AUTO: 0.01 K/UL (ref 0–0.11)
IMM GRANULOCYTES NFR BLD AUTO: 0.1 % (ref 0–0.9)
LYMPHOCYTES # BLD AUTO: 1.74 K/UL (ref 1–4.8)
LYMPHOCYTES NFR BLD: 22.8 % (ref 22–41)
MCH RBC QN AUTO: 32.2 PG (ref 27–33)
MCHC RBC AUTO-ENTMCNC: 32.4 G/DL (ref 32.2–35.5)
MCV RBC AUTO: 99.2 FL (ref 81.4–97.8)
MONOCYTES # BLD AUTO: 0.47 K/UL (ref 0–0.85)
MONOCYTES NFR BLD AUTO: 6.2 % (ref 0–13.4)
NEUTROPHILS # BLD AUTO: 5.3 K/UL (ref 1.82–7.42)
NEUTROPHILS NFR BLD: 69.3 % (ref 44–72)
NRBC # BLD AUTO: 0 K/UL
NRBC BLD-RTO: 0 /100 WBC (ref 0–0.2)
PLATELET # BLD AUTO: 253 K/UL (ref 164–446)
PMV BLD AUTO: 9.5 FL (ref 9–12.9)
RBC # BLD AUTO: 3.98 M/UL (ref 4.2–5.4)
WBC # BLD AUTO: 7.6 K/UL (ref 4.8–10.8)

## 2024-09-30 PROCEDURE — 36415 COLL VENOUS BLD VENIPUNCTURE: CPT

## 2024-09-30 PROCEDURE — 80053 COMPREHEN METABOLIC PANEL: CPT

## 2024-09-30 PROCEDURE — 83550 IRON BINDING TEST: CPT

## 2024-09-30 PROCEDURE — 86147 CARDIOLIPIN ANTIBODY EA IG: CPT

## 2024-09-30 PROCEDURE — 82728 ASSAY OF FERRITIN: CPT

## 2024-09-30 PROCEDURE — 85025 COMPLETE CBC W/AUTO DIFF WBC: CPT

## 2024-09-30 PROCEDURE — 83540 ASSAY OF IRON: CPT

## 2024-10-01 LAB
ALBUMIN SERPL BCP-MCNC: 4.2 G/DL (ref 3.2–4.9)
ALBUMIN/GLOB SERPL: 1.6 G/DL
ALP SERPL-CCNC: 54 U/L (ref 30–99)
ALT SERPL-CCNC: 18 U/L (ref 2–50)
ANION GAP SERPL CALC-SCNC: 11 MMOL/L (ref 7–16)
AST SERPL-CCNC: 28 U/L (ref 12–45)
BILIRUB SERPL-MCNC: 1.2 MG/DL (ref 0.1–1.5)
BUN SERPL-MCNC: 10 MG/DL (ref 8–22)
CALCIUM ALBUM COR SERPL-MCNC: 9.4 MG/DL (ref 8.5–10.5)
CALCIUM SERPL-MCNC: 9.6 MG/DL (ref 8.5–10.5)
CHLORIDE SERPL-SCNC: 105 MMOL/L (ref 96–112)
CO2 SERPL-SCNC: 23 MMOL/L (ref 20–33)
CREAT SERPL-MCNC: 0.6 MG/DL (ref 0.5–1.4)
GFR SERPLBLD CREATININE-BSD FMLA CKD-EPI: 122 ML/MIN/1.73 M 2
GLOBULIN SER CALC-MCNC: 2.7 G/DL (ref 1.9–3.5)
GLUCOSE SERPL-MCNC: 88 MG/DL (ref 65–99)
IRON SATN MFR SERPL: 45 % (ref 15–55)
IRON SERPL-MCNC: 105 UG/DL (ref 40–170)
POTASSIUM SERPL-SCNC: 4.6 MMOL/L (ref 3.6–5.5)
PROT SERPL-MCNC: 6.9 G/DL (ref 6–8.2)
SODIUM SERPL-SCNC: 139 MMOL/L (ref 135–145)
TIBC SERPL-MCNC: 232 UG/DL (ref 250–450)
UIBC SERPL-MCNC: 127 UG/DL (ref 110–370)

## 2024-10-02 LAB
CARDIOLIPIN IGA SER IA-ACNC: <10 APL
CARDIOLIPIN IGG SER IA-ACNC: <10 GPL
CARDIOLIPIN IGM SER IA-ACNC: <10 MPL

## 2024-10-24 ENCOUNTER — HOSPITAL ENCOUNTER (OUTPATIENT)
Dept: RADIOLOGY | Facility: MEDICAL CENTER | Age: 34
End: 2024-10-24
Attending: INTERNAL MEDICINE
Payer: COMMERCIAL

## 2024-10-24 DIAGNOSIS — E83.110 HEMOCHROMATOSIS ASSOCIATED WITH MUTATION IN HFE GENE (HCC): ICD-10-CM

## 2024-10-24 PROCEDURE — 74181 MRI ABDOMEN W/O CONTRAST: CPT

## 2024-11-04 ENCOUNTER — APPOINTMENT (OUTPATIENT)
Dept: HEMATOLOGY ONCOLOGY | Facility: MEDICAL CENTER | Age: 34
End: 2024-11-04
Attending: INTERNAL MEDICINE
Payer: COMMERCIAL

## 2024-11-12 ENCOUNTER — HOSPITAL ENCOUNTER (OUTPATIENT)
Dept: HEMATOLOGY ONCOLOGY | Facility: MEDICAL CENTER | Age: 34
End: 2024-11-12
Attending: INTERNAL MEDICINE
Payer: COMMERCIAL

## 2024-11-12 VITALS
RESPIRATION RATE: 14 BRPM | WEIGHT: 134 LBS | TEMPERATURE: 98.8 F | OXYGEN SATURATION: 98 % | HEART RATE: 80 BPM | SYSTOLIC BLOOD PRESSURE: 94 MMHG | BODY MASS INDEX: 25.3 KG/M2 | DIASTOLIC BLOOD PRESSURE: 64 MMHG | HEIGHT: 61 IN

## 2024-11-12 ASSESSMENT — PAIN SCALES - GENERAL: PAINLEVEL_OUTOF10: NO PAIN

## 2024-11-12 ASSESSMENT — FIBROSIS 4 INDEX: FIB4 SCORE: 0.83

## 2024-11-14 ENCOUNTER — HOSPITAL ENCOUNTER (OUTPATIENT)
Dept: HEMATOLOGY ONCOLOGY | Facility: MEDICAL CENTER | Age: 34
End: 2024-11-14
Attending: INTERNAL MEDICINE
Payer: COMMERCIAL

## 2024-11-14 VITALS
SYSTOLIC BLOOD PRESSURE: 108 MMHG | HEIGHT: 61 IN | RESPIRATION RATE: 9 BRPM | OXYGEN SATURATION: 98 % | DIASTOLIC BLOOD PRESSURE: 68 MMHG | WEIGHT: 135 LBS | TEMPERATURE: 99.2 F | HEART RATE: 91 BPM | BODY MASS INDEX: 25.49 KG/M2

## 2024-11-14 DIAGNOSIS — E83.119 HEMOCHROMATOSIS, UNSPECIFIED HEMOCHROMATOSIS TYPE: ICD-10-CM

## 2024-11-14 PROCEDURE — 99214 OFFICE O/P EST MOD 30 MIN: CPT | Performed by: INTERNAL MEDICINE

## 2024-11-14 PROCEDURE — 99212 OFFICE O/P EST SF 10 MIN: CPT | Performed by: INTERNAL MEDICINE

## 2024-11-14 ASSESSMENT — ENCOUNTER SYMPTOMS
SHORTNESS OF BREATH: 0
VOMITING: 0
BLURRED VISION: 0
WEIGHT LOSS: 0
ORTHOPNEA: 0
NECK PAIN: 0
DIZZINESS: 0
PALPITATIONS: 0
BRUISES/BLEEDS EASILY: 0
TREMORS: 0
WHEEZING: 0
MEMORY LOSS: 0
FOCAL WEAKNESS: 0
HEARTBURN: 0
CHILLS: 0
COUGH: 0
NAUSEA: 0
SENSORY CHANGE: 0
FEVER: 0
HEADACHES: 0
DEPRESSION: 0
TINGLING: 0
ABDOMINAL PAIN: 0
SORE THROAT: 0
SPUTUM PRODUCTION: 0

## 2024-11-14 ASSESSMENT — PAIN SCALES - GENERAL: PAINLEVEL_OUTOF10: NO PAIN

## 2024-11-14 ASSESSMENT — FIBROSIS 4 INDEX: FIB4 SCORE: 0.83

## 2024-11-14 NOTE — ADDENDUM NOTE
Encounter addended by: Marta Roberts, Med Ass't on: 11/14/2024 11:43 AM   Actions taken: Charge Capture section accepted

## 2024-11-14 NOTE — PROGRESS NOTES
Follow Up Note:  Hematology/Oncology      Primary Care:  HUSSEIN Cartagena    Diagnosis: Hemochromatosis    Chief Complaint: [unfilled]    Interval History:  Patient is here for follow up visit.  34 y.o. female who noted a rash approximately 6 months ago and had some other systemic symptoms.  Had a very extensive workup concern for leuk lupus.  Patient ultimately came back with positive for lupus anticoagulant as well as heterozygote for hemochromatosis H63D mutation.  Iron studies show in the past she has had a relatively stable iron currently she has an elevated iron no ferritin is available.  Patient presents for further workup and treatment as indicated.     Treatment History:     Allergies as of 11/14/2024    (No Known Allergies)         Current Outpatient Medications:     Aug Betamethasone Dipropionate (DIPROLENE-AF) 0.05 % Cream, , Disp: , Rfl:     predniSONE (DELTASONE) 20 MG Tab, Take 1 tablet by mouth every day., Disp: 5 tablet, Rfl: 0      Review of Systems:  Review of Systems   Constitutional:  Negative for chills, fever, malaise/fatigue and weight loss.   HENT:  Negative for congestion, ear pain, nosebleeds and sore throat.    Eyes:  Negative for blurred vision.   Respiratory:  Negative for cough, sputum production, shortness of breath and wheezing.    Cardiovascular:  Negative for chest pain, palpitations, orthopnea and leg swelling.   Gastrointestinal:  Negative for abdominal pain, heartburn, nausea and vomiting.   Genitourinary:  Negative for dysuria, frequency and urgency.   Musculoskeletal:  Negative for neck pain.   Neurological:  Negative for dizziness, tingling, tremors, sensory change, focal weakness and headaches.   Endo/Heme/Allergies:  Does not bruise/bleed easily.   Psychiatric/Behavioral:  Negative for depression, memory loss and suicidal ideas.    All other systems reviewed and are negative.        Physical Exam:  Vitals:    11/14/24 1034   BP: 108/68   BP Location: Left arm  "  Patient Position: Sitting   BP Cuff Size: Adult   Pulse: 91   Resp: (!) 9   Temp: 37.3 °C (99.2 °F)   TempSrc: Temporal   SpO2: 98%   Weight: 61.2 kg (135 lb)   Height: 1.549 m (5' 0.98\")               Physical Exam  Constitutional:       General: She is not in acute distress.  HENT:      Head: Normocephalic.   Eyes:      Conjunctiva/sclera: Conjunctivae normal.   Cardiovascular:      Rate and Rhythm: Normal rate and regular rhythm.      Heart sounds: Normal heart sounds.   Pulmonary:      Effort: Pulmonary effort is normal.      Breath sounds: Normal breath sounds.   Abdominal:      General: Bowel sounds are normal.      Palpations: Abdomen is soft.   Musculoskeletal:         General: Normal range of motion.   Lymphadenopathy:      Cervical: No cervical adenopathy.   Skin:     General: Skin is dry.   Neurological:      General: No focal deficit present.      Mental Status: She is oriented to person, place, and time.   Psychiatric:         Thought Content: Thought content normal.           Labs:       Imaging:     All listed images below have been independently reviewed by me. I agree with the findings as summarized below:    [id number]    Result Date: 10/24/2024  10/24/2024 1:12 PM HISTORY/REASON FOR EXAM:  Hemochromatosis TECHNIQUE/EXAM DESCRIPTION: TECHNIQUE/EXAM DESCRIPTION: EXAM: Multiplanar, multisequence MRI of the abdomen performed using iron quantification protocol. CONTRAST: None. MAGNET STRENGTH: [employer] 1.5 Minerva MRI scanner. COMPARISON: None FINDINGS: LIVER: Homogeneous signal. FAT FRACTION: 2.1 % IRON QUANTIFICATION: 0.92 mg Fe/ g tissue SPLEEN: Spleen measures approximately 8.9 cm. ADRENAL GLANDS: Unremarkable. PANCREAS: Normal T2 signal. No iron deposition appreciated. KIDNEYS: No hydronephrosis. No renal cysts. ASCITES: None.     1. LIVER IRON CONCENTRATION: 0.92 mg Fe/ g tissue 2. FAT FRACTION: 2.1 % Liver iron concentration limits in mg Fe/  to <2: No iron overload 2 to <4: Insignificant. 4 " to <6: Mild 6 to <8: Moderate 8 to <16: Moderate/severe >/= 16: Severe       Pathology:      Assessment & Plan:  Assessment & Plan  Hemochromatosis, unspecified hemochromatosis type    Orders:    CBC WITH DIFFERENTIAL; Future    Comp Metabolic Panel; Future    IRON/TOTAL IRON BIND; Future    FERRITIN; Future         Hemochromatosis.  Facial rash approximately 6 months ago abnormal lab values including hemochromatosis heterozygote for H63D mutation as well as positive lupus anticoagulant. Patient with mild abdominal discomfort right upper quadrant.  MRI abdomen 10/24/2024 reveals normal iron concentration.  Patient also with migratory arthralgias.  Ferritin less than 100.  Will see back in 6 months and follow-up on blood values.    Any questions and concerns raised by the patient were answered to the best of my ability. Thank you for allowing me to participate in the care for this patient. Please feel free to contact me for any questions or concerns.

## 2025-05-19 ENCOUNTER — APPOINTMENT (OUTPATIENT)
Dept: HEMATOLOGY ONCOLOGY | Facility: MEDICAL CENTER | Age: 35
End: 2025-05-19
Payer: COMMERCIAL